# Patient Record
Sex: FEMALE | Race: WHITE | NOT HISPANIC OR LATINO | URBAN - METROPOLITAN AREA
[De-identification: names, ages, dates, MRNs, and addresses within clinical notes are randomized per-mention and may not be internally consistent; named-entity substitution may affect disease eponyms.]

---

## 2020-03-29 VITALS
SYSTOLIC BLOOD PRESSURE: 135 MMHG | TEMPERATURE: 100 F | DIASTOLIC BLOOD PRESSURE: 80 MMHG | RESPIRATION RATE: 20 BRPM | OXYGEN SATURATION: 95 % | HEART RATE: 71 BPM

## 2020-03-29 LAB
ALBUMIN SERPL ELPH-MCNC: 3.9 G/DL — SIGNIFICANT CHANGE UP (ref 3.3–5)
ALP SERPL-CCNC: 73 U/L — SIGNIFICANT CHANGE UP (ref 40–120)
ALT FLD-CCNC: 55 U/L — HIGH (ref 10–45)
ANION GAP SERPL CALC-SCNC: 12 MMOL/L — SIGNIFICANT CHANGE UP (ref 5–17)
AST SERPL-CCNC: 59 U/L — HIGH (ref 10–40)
BASOPHILS # BLD AUTO: 0.01 K/UL — SIGNIFICANT CHANGE UP (ref 0–0.2)
BASOPHILS NFR BLD AUTO: 0.2 % — SIGNIFICANT CHANGE UP (ref 0–2)
BILIRUB SERPL-MCNC: 0.3 MG/DL — SIGNIFICANT CHANGE UP (ref 0.2–1.2)
BUN SERPL-MCNC: 10 MG/DL — SIGNIFICANT CHANGE UP (ref 7–23)
CALCIUM SERPL-MCNC: 8.6 MG/DL — SIGNIFICANT CHANGE UP (ref 8.4–10.5)
CHLORIDE SERPL-SCNC: 100 MMOL/L — SIGNIFICANT CHANGE UP (ref 96–108)
CO2 SERPL-SCNC: 22 MMOL/L — SIGNIFICANT CHANGE UP (ref 22–31)
CREAT SERPL-MCNC: 0.72 MG/DL — SIGNIFICANT CHANGE UP (ref 0.5–1.3)
EOSINOPHIL # BLD AUTO: 0 K/UL — SIGNIFICANT CHANGE UP (ref 0–0.5)
EOSINOPHIL NFR BLD AUTO: 0 % — SIGNIFICANT CHANGE UP (ref 0–6)
GLUCOSE SERPL-MCNC: 104 MG/DL — HIGH (ref 70–99)
HCT VFR BLD CALC: 38.9 % — SIGNIFICANT CHANGE UP (ref 34.5–45)
HGB BLD-MCNC: 13.2 G/DL — SIGNIFICANT CHANGE UP (ref 11.5–15.5)
IMM GRANULOCYTES NFR BLD AUTO: 1.5 % — SIGNIFICANT CHANGE UP (ref 0–1.5)
LYMPHOCYTES # BLD AUTO: 0.69 K/UL — LOW (ref 1–3.3)
LYMPHOCYTES # BLD AUTO: 17.1 % — SIGNIFICANT CHANGE UP (ref 13–44)
MCHC RBC-ENTMCNC: 31.5 PG — SIGNIFICANT CHANGE UP (ref 27–34)
MCHC RBC-ENTMCNC: 33.9 GM/DL — SIGNIFICANT CHANGE UP (ref 32–36)
MCV RBC AUTO: 92.8 FL — SIGNIFICANT CHANGE UP (ref 80–100)
MONOCYTES # BLD AUTO: 0.19 K/UL — SIGNIFICANT CHANGE UP (ref 0–0.9)
MONOCYTES NFR BLD AUTO: 4.7 % — SIGNIFICANT CHANGE UP (ref 2–14)
NEUTROPHILS # BLD AUTO: 3.08 K/UL — SIGNIFICANT CHANGE UP (ref 1.8–7.4)
NEUTROPHILS NFR BLD AUTO: 76.5 % — SIGNIFICANT CHANGE UP (ref 43–77)
NRBC # BLD: 0 /100 WBCS — SIGNIFICANT CHANGE UP (ref 0–0)
PLATELET # BLD AUTO: 47 K/UL — LOW (ref 150–400)
POTASSIUM SERPL-MCNC: 4.4 MMOL/L — SIGNIFICANT CHANGE UP (ref 3.5–5.3)
POTASSIUM SERPL-SCNC: 4.4 MMOL/L — SIGNIFICANT CHANGE UP (ref 3.5–5.3)
PROT SERPL-MCNC: 6.8 G/DL — SIGNIFICANT CHANGE UP (ref 6–8.3)
RBC # BLD: 4.19 M/UL — SIGNIFICANT CHANGE UP (ref 3.8–5.2)
RBC # FLD: 13.7 % — SIGNIFICANT CHANGE UP (ref 10.3–14.5)
SODIUM SERPL-SCNC: 134 MMOL/L — LOW (ref 135–145)
WBC # BLD: 4.03 K/UL — SIGNIFICANT CHANGE UP (ref 3.8–10.5)
WBC # FLD AUTO: 4.03 K/UL — SIGNIFICANT CHANGE UP (ref 3.8–10.5)

## 2020-03-29 NOTE — ED ADULT TRIAGE NOTE - CHIEF COMPLAINT QUOTE
presents to ED by EMS for SOB.  patient has had fevers x 1 week with diarrhea.  Was initiated on azithromycin 3 days ago.   admitted to hospital for COVID-19.

## 2020-03-29 NOTE — ED ADULT NURSE REASSESSMENT NOTE - NS ED NURSE REASSESS COMMENT FT1
Pt arrives via EMS c/o fever, cough and weakness. Assessment as noted, IV access established, labs drawn and nasopharyngeal swab obtained, sent to lab. waiting results

## 2020-03-30 ENCOUNTER — INPATIENT (INPATIENT)
Facility: HOSPITAL | Age: 70
LOS: 7 days | Discharge: HOME | DRG: 177 | End: 2020-04-07
Attending: INTERNAL MEDICINE | Admitting: INTERNAL MEDICINE
Payer: MEDICARE

## 2020-03-30 DIAGNOSIS — B97.21 SARS-ASSOCIATED CORONAVIRUS AS THE CAUSE OF DISEASES CLASSIFIED ELSEWHERE: ICD-10-CM

## 2020-03-30 DIAGNOSIS — Z29.9 ENCOUNTER FOR PROPHYLACTIC MEASURES, UNSPECIFIED: ICD-10-CM

## 2020-03-30 DIAGNOSIS — R63.8 OTHER SYMPTOMS AND SIGNS CONCERNING FOOD AND FLUID INTAKE: ICD-10-CM

## 2020-03-30 DIAGNOSIS — E03.9 HYPOTHYROIDISM, UNSPECIFIED: ICD-10-CM

## 2020-03-30 DIAGNOSIS — Z90.49 ACQUIRED ABSENCE OF OTHER SPECIFIED PARTS OF DIGESTIVE TRACT: Chronic | ICD-10-CM

## 2020-03-30 LAB
ALBUMIN SERPL ELPH-MCNC: 3.9 G/DL — SIGNIFICANT CHANGE UP (ref 3.3–5)
ALP SERPL-CCNC: 77 U/L — SIGNIFICANT CHANGE UP (ref 40–120)
ALT FLD-CCNC: 56 U/L — HIGH (ref 10–45)
ANION GAP SERPL CALC-SCNC: 12 MMOL/L — SIGNIFICANT CHANGE UP (ref 5–17)
APTT BLD: 32.1 SEC — SIGNIFICANT CHANGE UP (ref 27.5–36.3)
AST SERPL-CCNC: 63 U/L — HIGH (ref 10–40)
BASOPHILS # BLD AUTO: 0.01 K/UL — SIGNIFICANT CHANGE UP (ref 0–0.2)
BASOPHILS NFR BLD AUTO: 0.3 % — SIGNIFICANT CHANGE UP (ref 0–2)
BILIRUB SERPL-MCNC: 0.3 MG/DL — SIGNIFICANT CHANGE UP (ref 0.2–1.2)
BUN SERPL-MCNC: 9 MG/DL — SIGNIFICANT CHANGE UP (ref 7–23)
CALCIUM SERPL-MCNC: 8.6 MG/DL — SIGNIFICANT CHANGE UP (ref 8.4–10.5)
CHLORIDE SERPL-SCNC: 100 MMOL/L — SIGNIFICANT CHANGE UP (ref 96–108)
CK SERPL-CCNC: 116 U/L — SIGNIFICANT CHANGE UP (ref 25–170)
CO2 SERPL-SCNC: 25 MMOL/L — SIGNIFICANT CHANGE UP (ref 22–31)
CREAT SERPL-MCNC: 0.8 MG/DL — SIGNIFICANT CHANGE UP (ref 0.5–1.3)
CRP SERPL-MCNC: 8.97 MG/DL — HIGH (ref 0–0.4)
D DIMER BLD IA.RAPID-MCNC: 416 NG/ML DDU — HIGH
EOSINOPHIL # BLD AUTO: 0 K/UL — SIGNIFICANT CHANGE UP (ref 0–0.5)
EOSINOPHIL NFR BLD AUTO: 0 % — SIGNIFICANT CHANGE UP (ref 0–6)
ERYTHROCYTE [SEDIMENTATION RATE] IN BLOOD: 45 MM/HR — HIGH
FERRITIN SERPL-MCNC: 1423 NG/ML — HIGH (ref 15–150)
GLUCOSE SERPL-MCNC: 99 MG/DL — SIGNIFICANT CHANGE UP (ref 70–99)
HCT VFR BLD CALC: 40.5 % — SIGNIFICANT CHANGE UP (ref 34.5–45)
HGB BLD-MCNC: 13.5 G/DL — SIGNIFICANT CHANGE UP (ref 11.5–15.5)
IMM GRANULOCYTES NFR BLD AUTO: 1.8 % — HIGH (ref 0–1.5)
INR BLD: 1 — SIGNIFICANT CHANGE UP (ref 0.88–1.16)
LACTATE SERPL-SCNC: 0.8 MMOL/L — SIGNIFICANT CHANGE UP (ref 0.5–2)
LYMPHOCYTES # BLD AUTO: 0.74 K/UL — LOW (ref 1–3.3)
LYMPHOCYTES # BLD AUTO: 18.6 % — SIGNIFICANT CHANGE UP (ref 13–44)
MAGNESIUM SERPL-MCNC: 1.8 MG/DL — SIGNIFICANT CHANGE UP (ref 1.6–2.6)
MCHC RBC-ENTMCNC: 31.5 PG — SIGNIFICANT CHANGE UP (ref 27–34)
MCHC RBC-ENTMCNC: 33.3 GM/DL — SIGNIFICANT CHANGE UP (ref 32–36)
MCV RBC AUTO: 94.6 FL — SIGNIFICANT CHANGE UP (ref 80–100)
MONOCYTES # BLD AUTO: 0.22 K/UL — SIGNIFICANT CHANGE UP (ref 0–0.9)
MONOCYTES NFR BLD AUTO: 5.5 % — SIGNIFICANT CHANGE UP (ref 2–14)
NEUTROPHILS # BLD AUTO: 2.93 K/UL — SIGNIFICANT CHANGE UP (ref 1.8–7.4)
NEUTROPHILS NFR BLD AUTO: 73.8 % — SIGNIFICANT CHANGE UP (ref 43–77)
NRBC # BLD: 0 /100 WBCS — SIGNIFICANT CHANGE UP (ref 0–0)
NT-PROBNP SERPL-SCNC: 68 PG/ML — SIGNIFICANT CHANGE UP (ref 0–300)
PHOSPHATE SERPL-MCNC: 2.6 MG/DL — SIGNIFICANT CHANGE UP (ref 2.5–4.5)
PLATELET # BLD AUTO: 45 K/UL — LOW (ref 150–400)
POTASSIUM SERPL-MCNC: 4.7 MMOL/L — SIGNIFICANT CHANGE UP (ref 3.5–5.3)
POTASSIUM SERPL-SCNC: 4.7 MMOL/L — SIGNIFICANT CHANGE UP (ref 3.5–5.3)
PROCALCITONIN SERPL-MCNC: 0.1 NG/ML — SIGNIFICANT CHANGE UP (ref 0.02–0.1)
PROT SERPL-MCNC: 7 G/DL — SIGNIFICANT CHANGE UP (ref 6–8.3)
PROTHROM AB SERPL-ACNC: 11.4 SEC — SIGNIFICANT CHANGE UP (ref 10–12.9)
RBC # BLD: 4.28 M/UL — SIGNIFICANT CHANGE UP (ref 3.8–5.2)
RBC # FLD: 13.7 % — SIGNIFICANT CHANGE UP (ref 10.3–14.5)
SARS-COV-2 RNA SPEC QL NAA+PROBE: DETECTED
SODIUM SERPL-SCNC: 137 MMOL/L — SIGNIFICANT CHANGE UP (ref 135–145)
TROPONIN T SERPL-MCNC: <0.01 NG/ML — SIGNIFICANT CHANGE UP (ref 0–0.01)
WBC # BLD: 3.97 K/UL — SIGNIFICANT CHANGE UP (ref 3.8–10.5)
WBC # FLD AUTO: 3.97 K/UL — SIGNIFICANT CHANGE UP (ref 3.8–10.5)

## 2020-03-30 PROCEDURE — 71045 X-RAY EXAM CHEST 1 VIEW: CPT | Mod: 26

## 2020-03-30 PROCEDURE — 93010 ELECTROCARDIOGRAM REPORT: CPT

## 2020-03-30 PROCEDURE — 99285 EMERGENCY DEPT VISIT HI MDM: CPT

## 2020-03-30 PROCEDURE — 99222 1ST HOSP IP/OBS MODERATE 55: CPT | Mod: GC

## 2020-03-30 RX ORDER — FAMOTIDINE 10 MG/ML
20 INJECTION INTRAVENOUS DAILY
Refills: 0 | Status: DISCONTINUED | OUTPATIENT
Start: 2020-03-30 | End: 2020-04-07

## 2020-03-30 RX ORDER — HYDROXYCHLOROQUINE SULFATE 200 MG
400 TABLET ORAL EVERY 12 HOURS
Refills: 0 | Status: COMPLETED | OUTPATIENT
Start: 2020-03-30 | End: 2020-03-30

## 2020-03-30 RX ORDER — THIAMINE MONONITRATE (VIT B1) 100 MG
200 TABLET ORAL DAILY
Refills: 0 | Status: DISCONTINUED | OUTPATIENT
Start: 2020-03-30 | End: 2020-03-30

## 2020-03-30 RX ORDER — LEVOTHYROXINE SODIUM 125 MCG
25 TABLET ORAL DAILY
Refills: 0 | Status: DISCONTINUED | OUTPATIENT
Start: 2020-03-30 | End: 2020-04-07

## 2020-03-30 RX ORDER — LEVOTHYROXINE SODIUM 125 MCG
1 TABLET ORAL
Qty: 0 | Refills: 0 | DISCHARGE

## 2020-03-30 RX ORDER — THIAMINE MONONITRATE (VIT B1) 100 MG
200 TABLET ORAL
Refills: 0 | Status: COMPLETED | OUTPATIENT
Start: 2020-03-30 | End: 2020-04-03

## 2020-03-30 RX ORDER — HYDROXYCHLOROQUINE SULFATE 200 MG
TABLET ORAL
Refills: 0 | Status: DISCONTINUED | OUTPATIENT
Start: 2020-03-30 | End: 2020-03-30

## 2020-03-30 RX ORDER — THIAMINE MONONITRATE (VIT B1) 100 MG
200 TABLET ORAL
Refills: 0 | Status: DISCONTINUED | OUTPATIENT
Start: 2020-03-30 | End: 2020-03-30

## 2020-03-30 RX ORDER — AZITHROMYCIN 500 MG/1
250 TABLET, FILM COATED ORAL DAILY
Refills: 0 | Status: COMPLETED | OUTPATIENT
Start: 2020-03-30 | End: 2020-04-03

## 2020-03-30 RX ORDER — HYDROXYCHLOROQUINE SULFATE 200 MG
200 TABLET ORAL EVERY 12 HOURS
Refills: 0 | Status: COMPLETED | OUTPATIENT
Start: 2020-03-31 | End: 2020-04-03

## 2020-03-30 RX ORDER — ONDANSETRON 8 MG/1
4 TABLET, FILM COATED ORAL ONCE
Refills: 0 | Status: COMPLETED | OUTPATIENT
Start: 2020-03-30 | End: 2020-03-30

## 2020-03-30 RX ORDER — ASCORBIC ACID 60 MG
1500 TABLET,CHEWABLE ORAL DAILY
Refills: 0 | Status: DISCONTINUED | OUTPATIENT
Start: 2020-03-30 | End: 2020-03-30

## 2020-03-30 RX ORDER — ACETAMINOPHEN 500 MG
0 TABLET ORAL
Qty: 0 | Refills: 0 | DISCHARGE

## 2020-03-30 RX ORDER — ACETAMINOPHEN 500 MG
650 TABLET ORAL EVERY 6 HOURS
Refills: 0 | Status: DISCONTINUED | OUTPATIENT
Start: 2020-03-30 | End: 2020-04-01

## 2020-03-30 RX ORDER — ASCORBIC ACID 60 MG
1500 TABLET,CHEWABLE ORAL
Refills: 0 | Status: DISCONTINUED | OUTPATIENT
Start: 2020-03-30 | End: 2020-04-03

## 2020-03-30 RX ORDER — HYDROXYCHLOROQUINE SULFATE 200 MG
200 TABLET ORAL ONCE
Refills: 0 | Status: COMPLETED | OUTPATIENT
Start: 2020-03-31 | End: 2020-03-31

## 2020-03-30 RX ORDER — ENOXAPARIN SODIUM 100 MG/ML
40 INJECTION SUBCUTANEOUS EVERY 24 HOURS
Refills: 0 | Status: DISCONTINUED | OUTPATIENT
Start: 2020-03-30 | End: 2020-03-30

## 2020-03-30 RX ADMIN — Medication 650 MILLIGRAM(S): at 03:51

## 2020-03-30 RX ADMIN — Medication 650 MILLIGRAM(S): at 18:02

## 2020-03-30 RX ADMIN — Medication 400 MILLIGRAM(S): at 18:02

## 2020-03-30 RX ADMIN — Medication 1500 MILLIGRAM(S): at 18:02

## 2020-03-30 RX ADMIN — Medication 1500 MILLIGRAM(S): at 13:05

## 2020-03-30 RX ADMIN — ENOXAPARIN SODIUM 40 MILLIGRAM(S): 100 INJECTION SUBCUTANEOUS at 13:02

## 2020-03-30 RX ADMIN — Medication 400 MILLIGRAM(S): at 09:27

## 2020-03-30 RX ADMIN — Medication 650 MILLIGRAM(S): at 02:12

## 2020-03-30 RX ADMIN — Medication 200 MILLIGRAM(S): at 06:58

## 2020-03-30 RX ADMIN — Medication 200 MILLIGRAM(S): at 18:01

## 2020-03-30 RX ADMIN — Medication 25 MICROGRAM(S): at 06:58

## 2020-03-30 RX ADMIN — AZITHROMYCIN 250 MILLIGRAM(S): 500 TABLET, FILM COATED ORAL at 13:04

## 2020-03-30 RX ADMIN — ONDANSETRON 4 MILLIGRAM(S): 8 TABLET, FILM COATED ORAL at 13:03

## 2020-03-30 RX ADMIN — FAMOTIDINE 20 MILLIGRAM(S): 10 INJECTION INTRAVENOUS at 13:03

## 2020-03-30 RX ADMIN — Medication 1500 MILLIGRAM(S): at 09:27

## 2020-03-30 RX ADMIN — Medication 650 MILLIGRAM(S): at 13:02

## 2020-03-30 RX ADMIN — ONDANSETRON 4 MILLIGRAM(S): 8 TABLET, FILM COATED ORAL at 00:54

## 2020-03-30 NOTE — H&P ADULT - NSHPPHYSICALEXAM_GEN_ALL_CORE
.  VITAL SIGNS:  T(C): 37.7 (03-29-20 @ 22:13), Max: 37.7 (03-29-20 @ 22:13)  T(F): 99.9 (03-29-20 @ 22:13), Max: 99.9 (03-29-20 @ 22:13)  HR: 71 (03-29-20 @ 22:13) (71 - 71)  BP: 135/80 (03-29-20 @ 22:13) (135/80 - 135/80)  BP(mean): --  RR: 20 (03-29-20 @ 22:13) (20 - 20)  SpO2: 91% (03-30-20 @ 00:12) (91% - 95%)  Wt(kg): --    PHYSICAL EXAM:    Constitutional: WDWN resting comfortably in bed on 2L NC; NAD  Head: NC/AT  Eyes: PERRL, EOMI, anicteric sclera  ENT: no nasal discharge; uvula midline, no oropharyngeal erythema or exudates; MMM  Neck: supple; no JVD or thyromegaly  Respiratory: CTA B/L; no W/R/R, no retractions  Cardiac: +S1/S2; RRR; no M/R/G; PMI non-displaced  Gastrointestinal: abdomen soft, NT/ND; no rebound or guarding; +BSx4  Back: spine midline, no bony tenderness or step-offs; no CVAT B/L  Extremities: WWP, no clubbing or cyanosis; no peripheral edema  Musculoskeletal: NROM x4; no joint swelling, tenderness or erythema  Vascular: 2+ radial, femoral, DP/PT pulses B/L  Dermatologic: skin warm, dry and intact; no rashes, wounds, or scars  Lymphatic: no submandibular or cervical LAD  Neurologic: AAOx3; CNII-XII grossly intact; no focal deficits  Psychiatric: affect and characteristics of appearance, verbalizations, behaviors are appropriate

## 2020-03-30 NOTE — H&P ADULT - NSHPREVIEWOFSYSTEMS_GEN_ALL_CORE
Fevers: Y  Malaise: Y  Myalgias: Y  SOB: Y          At rest: Y         With exertion: Y/N         At baseline: Y/ N  Cough: Y        Productive: N  Nausea: Y  Vomiting: Y  Diarrhea: Y

## 2020-03-30 NOTE — H&P ADULT - NSHPLABSRESULTS_GEN_ALL_CORE
13.2   4.03  )-----------( 47       ( 29 Mar 2020 22:57 )             38.9     03-29    134<L>  |  100  |  10  ----------------------------<  104<H>  4.4   |  22  |  0.72    Ca    8.6      29 Mar 2020 22:56    TPro  6.8  /  Alb  3.9  /  TBili  0.3  /  DBili  x   /  AST  59<H>  /  ALT  55<H>  /  AlkPhos  73  03-29

## 2020-03-30 NOTE — ED PROVIDER NOTE - OBJECTIVE STATEMENT
69F nonsmoker, no medical problems/no Rx, c/o 5d daily fever (tmax 102)/cough/malaise/myalagias/decreased appetite/nausea and now 2-3d progressively worsening sob. no ha/dizziness, no vision changes, no neck pain/stiffness, no abd pain/vomiting, no diarrhea, no dysuria. 69F nonsmoker, no medical problems/no Rx, c/o 5d daily fever (tmax 102)/cough/malaise/myalgias/decreased appetite/nausea and now 2-3d progressively worsening sob. no ha/dizziness, no vision changes, no neck pain/stiffness, no abd pain/vomiting, no diarrhea, no dysuria.

## 2020-03-30 NOTE — ED PROVIDER NOTE - CLINICAL SUMMARY MEDICAL DECISION MAKING FREE TEXT BOX
afebrile. hds. hypoxia 91% on RA improved w/ 2L nc. no active cp. no acute resp distress. found to have lymphocytopenia and cxr c/f pna/covid. covid pending. bcx pending. no sig anemia. no electrolyte abnl. trop neg. ekg w/o acute abnl. will admit.

## 2020-03-30 NOTE — ED PROVIDER NOTE - PHYSICAL EXAMINATION
CONST: tired appearing NAD speaking in full sentences on NC  HEAD: atraumatic  EYES: conjunctivae clear  ENT: mmm  NECK: supple/FROM, nttp, no jvd  CARD: rrr no murmurs  CHEST: bl rales  ABD: soft, nd, nttp, no rebound/guarding  EXT: FROM, symmetric distal pulses intact  SKIN: warm, dry, no rash, no pedal edema/pain/rash, cap refill <2sec  NEURO: a+ox3, 5/5 strength x4, gross sensation intact x4

## 2020-03-30 NOTE — ED ADULT NURSE REASSESSMENT NOTE - NS ED NURSE REASSESS COMMENT FT1
Orders received, pt medicated per MAR, tolerated well. Pt waiting admission bed assignment. NAD noted

## 2020-03-30 NOTE — H&P ADULT - HISTORY OF PRESENT ILLNESS
Pt is a 68 y/o F with PMHx hypothyroidism, splenic artery aneurysm (s/p surgery), appendectomy who presented to ED with fever and worsening SOB x5days. Pt states her symptoms started with myalgia and weakness 5-6 days ago. About a day later she started to have fevers and SOB. Her highest fever was 102. She has been taking tylenol for the fevers. Her SOB was at rest and worse when speaking. Pt was admits to having non productive cough with deep breaths, nausea, vomiting (+1), diarrhea.     ED Course:   Vitals: Temp: 99, /80, HR 71, O2 95% on 2L NC Pt is a 70 y/o F with PMHx hypothyroidism, splenic artery aneurysm (s/p surgery), appendectomy who presented to ED with fever and worsening SOB x5days. Pt states her symptoms started with myalgia and weakness 5-6 days ago. About a day later she started to have fevers and SOB. Her highest fever was 102. She has been taking tylenol for the fevers. Her SOB is at rest and worse when speaking. Pt was admits to having non productive cough with deep breaths, nausea, vomiting (+1), diarrhea.  Pt was started by her azithromycin 3 days ago by her PCP.     Of note, Pt's  is currently admitted and [facility] with Corona virus    ED Course:   Vitals: Temp: 99, /80, HR 71, O2 95% on 2L NC  Significant labs: thrombocytopenia- 47, lymphopenia 0.69, hyponatremia 134, AST/ALT 59/55  COVID- Pending  CXR: B/l infiltrate; official read pending Pt is a 68 y/o F with PMHx hypothyroidism, splenic artery aneurysm (s/p surgery), appendectomy who presented to ED with fever and worsening SOB x5days. Pt states her symptoms started with myalgia and weakness 5-6 days ago. About a day later she started to have fevers and SOB. Her highest fever was 102. She has been taking tylenol for the fevers. Her SOB is at rest and worse when speaking. Pt admits to having non productive cough with deep breaths, nausea, vomiting (+1), diarrhea.  Pt was started on azithromycin 3 days ago by her PCP.     Of note, Pt's  is currently admitted and [facility] with Corona virus    ED Course:   Vitals: Temp: 99, /80, HR 71, O2 95% on 2L NC  Significant labs: thrombocytopenia- 47, lymphopenia 0.69, hyponatremia 134, AST/ALT 59/55  COVID- Pending  CXR: B/l infiltrate; official read pending Pt is a 70 y/o F with PMHx hypothyroidism, splenic artery aneurysm (s/p surgery), appendectomy who presented to ED with fever and worsening SOB x5days. Pt states her symptoms started with myalgia and weakness 5-6 days ago. About a day later she started to have fevers and SOB. Her highest fever was 102. She has been taking tylenol for the fevers. Her SOB is at rest and worse when speaking. Pt admits to having non productive cough with deep breaths, nausea, vomiting (+1), diarrhea.  Pt was started on azithromycin 3 days ago by her PCP.     Of note, Pt's  is currently admitted at [facility] with Corona virus    ED Course:   Vitals: Temp: 99, /80, HR 71, O2 95% on 2L NC  Significant labs: thrombocytopenia- 47, lymphopenia 0.69, hyponatremia 134, AST/ALT 59/55  COVID- Pending  CXR: B/l infiltrate; official read pending

## 2020-03-30 NOTE — ED PROVIDER NOTE - CARE PLAN
Principal Discharge DX:	Hypoxia  Secondary Diagnosis:	Shortness of breath  Secondary Diagnosis:	Fever

## 2020-03-30 NOTE — H&P ADULT - PROBLEM SELECTOR PLAN 1
Pt with symptoms suggestive of COVID, b/l lobe infiltrates, thrombocytopenia- 47, lymphopenia 0.69, hyponatremia 134, AST/ALT 59/55    - Isolation precautions; contact and isolation  - F/u covid-19 PCR  - Monitor O2 saturation, monitor for respiratory distress  - Nasal cannula as needed, avoid HFNC/BiPAP  - Tylenol 650mg for fever   - Started ascorbic acid 1500mg IV q6h  - Started thiamine 200mg IV BID  - Started hydroxyxhloroquine 400mg IV BID x2 doses, then 200mg BID for 4 days  - Started azithromycin 250mg QD  - F/u D-Dimer, Fibrinogen, Haptoglobin, Procalcitonin, Ferritin, ESR/CRP, LDH, CK  - F/u G6PD

## 2020-03-30 NOTE — H&P ADULT - ATTENDING COMMENTS
69F w hypothyroidism, splenic artery aneurysm p/w fever and dyspnea x5 days, started on azithro by PCP 3d prior, recent diarrhea, CXR w R>L opacity, found to be hypoxemic on RA now on 2L NC c/f viral pna 2/2 COVID19, started on azirthromycin/plaquenil, VitC, Thiamine bundle    Pt resting in bed in ED - on 3L. Endorses fatigue and poor appetite. States dyspnea feels about the same as before she came in. Denies any chest pain, abd pain, dysuria. Does endorse nausea and chills.     Exam: female in NAD, RRR, nml effort, b/l inspiratory rales at bases, soft abd, NABS, non-tender, alert, moving all ext  Labs: eSR 45, A.74, D-dimer 416, lactate 0.8, ferritin 1423, CRP 8.9, procalc <0.01, BNP 58    #Acute hypoxemic respiratory failure - likely 2/2 viral pna. no edema or BNP to suggest HF or volume overload. PE less likely. Less likely bacterial pna w negative procalcitonin and lack of consolidation  #Viral pneumonia - b/l   #Suspected COVID19 - testing pending. Zofran for nausea. c/w treatment bundle (indicated in setting of hypoxemia and elevated ferritin, d-dimer)  #Hypothyroidism - synthroid 25    Plan  f/u COVID PCR  AM labs w inflammatory markers, AM EKG (QTc 403 3/30)  Encourage PO, fluid intake  Titrate O2 >93%  PPx w SQL  Dispo pending improvement in O2 requirement  Anticipated dc date 48h 69F w hypothyroidism, splenic artery aneurysm p/w fever and dyspnea x5 days, started on azithro by PCP 3d prior, recent diarrhea, CXR w R>L opacity, found to be hypoxemic on RA now on 2L NC c/f viral pna 2/2 COVID19, started on azirthromycin/plaquenil, VitC, Thiamine bundle    Pt resting in bed in ED - on 3L. Endorses fatigue and poor appetite. States dyspnea feels about the same as before she came in. Denies any chest pain, abd pain, dysuria. Does endorse nausea and chills.     Exam: female in NAD, RRR, nml effort, b/l inspiratory rales at bases, soft abd, NABS, non-tender, alert, moving all ext  Labs: eSR 45, A.74, D-dimer 416, lactate 0.8, ferritin 1423, CRP 8.9, procalc <0.01, BNP 58    #Acute hypoxemic respiratory failure - likely 2/2 viral pna. no edema or BNP to suggest HF or volume overload. PE less likely. Less likely bacterial pna w negative procalcitonin and lack of consolidation  #Viral pneumonia - b/l   #Suspected COVID19 - testing pending. Zofran for nausea. c/w treatment bundle (indicated in setting of hypoxemia and elevated ferritin, d-dimer)  #Hypothyroidism - synthroid 25  #Thrombocytopenia - 45 from 47 on admission. Does not have any bleeding sxs at this time. Would hold pharmacologic PPx    Plan  f/u COVID PCR  AM labs w inflammatory markers, AM EKG (QTc 403 3/30)  Encourage PO, fluid intake  Titrate O2 >93%  Holding pharmacologic ppx (SQL) due to thrombocytopenia  SCDs, OOB as tolerated  Dispo pending improvement in O2 requirement  Anticipated dc date 48h

## 2020-03-30 NOTE — ED ADULT NURSE REASSESSMENT NOTE - NS ED NURSE REASSESS COMMENT FT1
pt noted to have fever of 101F on 7 am VS, pt stating she received PO tylenol at that time, VS recheck, temp now at 99.5F, all other VS stable, pt in no distress, two sets of cultures sent, medicine team paged, awaiting callback

## 2020-03-30 NOTE — H&P ADULT - ASSESSMENT
Pt is a 68 y/o F with PMHx hypothyroidism, splenic artery aneurysm (s/p surgery), appendectomy, now admitted to r/o Corona virus

## 2020-03-31 DIAGNOSIS — J96.91 RESPIRATORY FAILURE, UNSPECIFIED WITH HYPOXIA: ICD-10-CM

## 2020-03-31 DIAGNOSIS — D69.6 THROMBOCYTOPENIA, UNSPECIFIED: ICD-10-CM

## 2020-03-31 LAB
ALBUMIN SERPL ELPH-MCNC: 3.5 G/DL — SIGNIFICANT CHANGE UP (ref 3.3–5)
ALP SERPL-CCNC: 77 U/L — SIGNIFICANT CHANGE UP (ref 40–120)
ALT FLD-CCNC: 66 U/L — HIGH (ref 10–45)
ANION GAP SERPL CALC-SCNC: 12 MMOL/L — SIGNIFICANT CHANGE UP (ref 5–17)
AST SERPL-CCNC: 80 U/L — HIGH (ref 10–40)
BASOPHILS # BLD AUTO: 0 K/UL — SIGNIFICANT CHANGE UP (ref 0–0.2)
BASOPHILS NFR BLD AUTO: 0 % — SIGNIFICANT CHANGE UP (ref 0–2)
BILIRUB SERPL-MCNC: 0.3 MG/DL — SIGNIFICANT CHANGE UP (ref 0.2–1.2)
BUN SERPL-MCNC: 10 MG/DL — SIGNIFICANT CHANGE UP (ref 7–23)
CALCIUM SERPL-MCNC: 8.6 MG/DL — SIGNIFICANT CHANGE UP (ref 8.4–10.5)
CHLORIDE SERPL-SCNC: 97 MMOL/L — SIGNIFICANT CHANGE UP (ref 96–108)
CLOSURE TME COLL+EPINEP BLD: 45 K/UL — LOW (ref 150–400)
CLOSURE TME COLL+EPINEP BLD: 62 K/UL — LOW (ref 150–400)
CO2 SERPL-SCNC: 24 MMOL/L — SIGNIFICANT CHANGE UP (ref 22–31)
CREAT SERPL-MCNC: 0.85 MG/DL — SIGNIFICANT CHANGE UP (ref 0.5–1.3)
CRP SERPL-MCNC: 9.52 MG/DL — HIGH (ref 0–0.4)
CULTURE RESULTS: SIGNIFICANT CHANGE UP
D DIMER BLD IA.RAPID-MCNC: 308 NG/ML DDU — HIGH
EOSINOPHIL # BLD AUTO: 0 K/UL — SIGNIFICANT CHANGE UP (ref 0–0.5)
EOSINOPHIL NFR BLD AUTO: 0 % — SIGNIFICANT CHANGE UP (ref 0–6)
FERRITIN SERPL-MCNC: 1918 NG/ML — HIGH (ref 15–150)
GLUCOSE SERPL-MCNC: 96 MG/DL — SIGNIFICANT CHANGE UP (ref 70–99)
HCT VFR BLD CALC: 37.7 % — SIGNIFICANT CHANGE UP (ref 34.5–45)
HCV AB S/CO SERPL IA: 0.08 S/CO — SIGNIFICANT CHANGE UP
HCV AB SERPL-IMP: SIGNIFICANT CHANGE UP
HGB BLD-MCNC: 12.4 G/DL — SIGNIFICANT CHANGE UP (ref 11.5–15.5)
LDH SERPL L TO P-CCNC: 322 U/L — HIGH (ref 50–242)
LYMPHOCYTES # BLD AUTO: 0.41 K/UL — LOW (ref 1–3.3)
LYMPHOCYTES # BLD AUTO: 11.4 % — LOW (ref 13–44)
MAGNESIUM SERPL-MCNC: 2 MG/DL — SIGNIFICANT CHANGE UP (ref 1.6–2.6)
MCHC RBC-ENTMCNC: 31.2 PG — SIGNIFICANT CHANGE UP (ref 27–34)
MCHC RBC-ENTMCNC: 32.9 GM/DL — SIGNIFICANT CHANGE UP (ref 32–36)
MCV RBC AUTO: 95 FL — SIGNIFICANT CHANGE UP (ref 80–100)
MONOCYTES # BLD AUTO: 0.29 K/UL — SIGNIFICANT CHANGE UP (ref 0–0.9)
MONOCYTES NFR BLD AUTO: 7.9 % — SIGNIFICANT CHANGE UP (ref 2–14)
NEUTROPHILS # BLD AUTO: 2.73 K/UL — SIGNIFICANT CHANGE UP (ref 1.8–7.4)
NEUTROPHILS NFR BLD AUTO: 70.2 % — SIGNIFICANT CHANGE UP (ref 43–77)
PHOSPHATE SERPL-MCNC: 2.5 MG/DL — SIGNIFICANT CHANGE UP (ref 2.5–4.5)
PLATELET # BLD AUTO: 56 K/UL — LOW (ref 150–400)
POTASSIUM SERPL-MCNC: 4.2 MMOL/L — SIGNIFICANT CHANGE UP (ref 3.5–5.3)
POTASSIUM SERPL-SCNC: 4.2 MMOL/L — SIGNIFICANT CHANGE UP (ref 3.5–5.3)
PROT SERPL-MCNC: 6.7 G/DL — SIGNIFICANT CHANGE UP (ref 6–8.3)
RBC # BLD: 3.97 M/UL — SIGNIFICANT CHANGE UP (ref 3.8–5.2)
RBC # FLD: 13.7 % — SIGNIFICANT CHANGE UP (ref 10.3–14.5)
SARS-COV-2 RNA SPEC QL NAA+PROBE: (no result)
SODIUM SERPL-SCNC: 133 MMOL/L — LOW (ref 135–145)
SPECIMEN SOURCE: SIGNIFICANT CHANGE UP
WBC # BLD: 3.62 K/UL — LOW (ref 3.8–10.5)
WBC # FLD AUTO: 3.62 K/UL — LOW (ref 3.8–10.5)

## 2020-03-31 PROCEDURE — 99233 SBSQ HOSP IP/OBS HIGH 50: CPT | Mod: GC

## 2020-03-31 RX ORDER — SODIUM CHLORIDE 9 MG/ML
500 INJECTION INTRAMUSCULAR; INTRAVENOUS; SUBCUTANEOUS ONCE
Refills: 0 | Status: COMPLETED | OUTPATIENT
Start: 2020-03-31 | End: 2020-03-31

## 2020-03-31 RX ORDER — ONDANSETRON 8 MG/1
4 TABLET, FILM COATED ORAL ONCE
Refills: 0 | Status: COMPLETED | OUTPATIENT
Start: 2020-03-31 | End: 2020-03-31

## 2020-03-31 RX ORDER — BENZOCAINE AND MENTHOL 5; 1 G/100ML; G/100ML
1 LIQUID ORAL
Refills: 0 | Status: DISCONTINUED | OUTPATIENT
Start: 2020-03-31 | End: 2020-04-07

## 2020-03-31 RX ADMIN — Medication 200 MILLIGRAM(S): at 17:27

## 2020-03-31 RX ADMIN — Medication 1500 MILLIGRAM(S): at 22:39

## 2020-03-31 RX ADMIN — Medication 100 MILLIGRAM(S): at 00:46

## 2020-03-31 RX ADMIN — Medication 650 MILLIGRAM(S): at 17:26

## 2020-03-31 RX ADMIN — Medication 1500 MILLIGRAM(S): at 00:32

## 2020-03-31 RX ADMIN — FAMOTIDINE 20 MILLIGRAM(S): 10 INJECTION INTRAVENOUS at 11:33

## 2020-03-31 RX ADMIN — SODIUM CHLORIDE 1000 MILLILITER(S): 9 INJECTION INTRAMUSCULAR; INTRAVENOUS; SUBCUTANEOUS at 18:15

## 2020-03-31 RX ADMIN — Medication 25 MICROGRAM(S): at 06:56

## 2020-03-31 RX ADMIN — Medication 1500 MILLIGRAM(S): at 11:33

## 2020-03-31 RX ADMIN — Medication 650 MILLIGRAM(S): at 22:39

## 2020-03-31 RX ADMIN — Medication 1500 MILLIGRAM(S): at 17:27

## 2020-03-31 RX ADMIN — ONDANSETRON 4 MILLIGRAM(S): 8 TABLET, FILM COATED ORAL at 09:02

## 2020-03-31 RX ADMIN — Medication 200 MILLIGRAM(S): at 06:58

## 2020-03-31 RX ADMIN — ONDANSETRON 4 MILLIGRAM(S): 8 TABLET, FILM COATED ORAL at 18:15

## 2020-03-31 RX ADMIN — Medication 650 MILLIGRAM(S): at 00:32

## 2020-03-31 RX ADMIN — Medication 650 MILLIGRAM(S): at 06:56

## 2020-03-31 RX ADMIN — AZITHROMYCIN 250 MILLIGRAM(S): 500 TABLET, FILM COATED ORAL at 11:33

## 2020-03-31 RX ADMIN — Medication 1500 MILLIGRAM(S): at 06:56

## 2020-03-31 RX ADMIN — Medication 200 MILLIGRAM(S): at 06:57

## 2020-03-31 RX ADMIN — Medication 650 MILLIGRAM(S): at 11:33

## 2020-03-31 NOTE — PROGRESS NOTE ADULT - PROBLEM SELECTOR PLAN 2
Patient presented with SOB and fevers, + COVID, still febrile overnight and requiring supplemental oxygen  - Isolation precautions; contact and isolation  - COVID +   - Monitor O2 saturation, monitor for respiratory distress  - Nasal cannula as needed, avoid HFNC/BiPAP  - Tylenol 650mg for fever   - Started ascorbic acid 1500mg IV q6h  - Started thiamine 200mg IV BID  - Started hydroxyxhloroquine 400mg IV BID x2 doses, then 200mg BID for 4 days  - Started azithromycin 250mg QD  - CRP and Ferritin uptrending

## 2020-03-31 NOTE — PROGRESS NOTE ADULT - PROBLEM SELECTOR PLAN 5
F: No fluids   E: Replete for K<4.0 and Mg<2.0   N: Regular Diet   DVT: None (Thrombocytopenia)   GI: Famotidine

## 2020-03-31 NOTE — PROGRESS NOTE ADULT - ASSESSMENT
Pt is a 70 y/o F with PMHx hypothyroidism, splenic artery aneurysm (s/p surgery), appendectomy, now admitted with Corona virus

## 2020-03-31 NOTE — PROGRESS NOTE ADULT - ATTENDING COMMENTS
Patient seen and examined at bedside. Agree with the history, physical exam, assessment, and plan as outlined above with the following addendum. Briefly patient is a 68 yo F  hypothyroidism, splenic artery aneurysm (s/p surgery), appendectomy, now admitted with CoVID 19    COVID 19- with hypoxia requiring supplemental oxygen  Fever curve downtrending. Patient is now requiring 2L NC, and will wean as tolerated   Labs significant for leukopenia, thrombocytopenia. CRP, ferritin elevated  -C/w covid treatment bundle: azithromycin, plaquenil, ascorbic acid, thiamine    Hyponatremia  -likely 2.2 hypovolemia, as pt has had poor appetite and is having diarrhea  -will do small fluid challenge of 500 cc NS and monitor    Diarrhea  likely 2/2 COVID 19, but will rule out other infectious etiologies- GI PCR, C diff

## 2020-03-31 NOTE — PROGRESS NOTE ADULT - PROBLEM SELECTOR PLAN 1
Patient presented with SOB, and with hypoxia 2/2 COVID + infection   - Currently on 3L NC, comfortable appearing   - Wean as tolerated

## 2020-03-31 NOTE — PROGRESS NOTE ADULT - SUBJECTIVE AND OBJECTIVE BOX
OVERNIGHT EVENTS: Patient spiked a fever to 101, was given Tylenol     SUBJECTIVE / INTERVAL HPI: Patient seen and examined at bedside.     VITAL SIGNS:  Vital Signs Last 24 Hrs  T(C): 37.1 (31 Mar 2020 09:23), Max: 38.9 (31 Mar 2020 06:35)  T(F): 98.8 (31 Mar 2020 09:23), Max: 102.1 (31 Mar 2020 06:35)  HR: 71 (31 Mar 2020 09:23) (67 - 73)  BP: 117/68 (31 Mar 2020 09:23) (117/68 - 145/54)  BP(mean): --  RR: 20 (31 Mar 2020 09:23) (16 - 20)  SpO2: 93% (31 Mar 2020 09:23) (92% - 97%)    REVIEW OF SYSTEMS:    CONSTITUTIONAL: No weakness, fevers or chills.   EYES/ENT: No visual changes;  No vertigo or throat pain   NECK: No pain or stiffness  RESPIRATORY: No cough, wheezing, hemoptysis; No shortness of breath  CARDIOVASCULAR: No chest pain or palpitations  GASTROINTESTINAL: No abdominal or epigastric pain. No nausea, vomiting, or hematemesis; No diarrhea or constipation. No melena or hematochezia.  GENITOURINARY: No dysuria, frequency or hematuria  NEUROLOGICAL: No numbness or weakness  SKIN: No itching, burning, rashes, or lesions   All other review of systems is negative unless indicated above.    PHYSICAL EXAM:  General: WDWN  HEENT: NC/AT; PERRL, clear conjunctiva  Neck: supple, no JVD,   Cardiovascular: +S1/S2; RRR, no M/R/G  Respiratory: CTA b/l; no W/R/R  Gastrointestinal: soft, NT/ND; +BSx4  Extremities: WWP; 2+ peripheral pulses; no edema   Neurological: AAOx3; no focal deficits    MEDICATIONS:  MEDICATIONS  (STANDING):  acetaminophen   Tablet .. 650 milliGRAM(s) Oral every 6 hours  ascorbic acid 1500 milliGRAM(s) Oral four times a day  azithromycin   Tablet 250 milliGRAM(s) Oral daily  famotidine    Tablet 20 milliGRAM(s) Oral daily  hydroxychloroquine 200 milliGRAM(s) Oral every 12 hours  levothyroxine 25 MICROGram(s) Oral daily  thiamine 200 milliGRAM(s) Oral <User Schedule>    MEDICATIONS  (PRN):  benzocaine 15 mG/menthol 3.6 mG (Sugar-Free) Lozenge 1 Lozenge Oral two times a day PRN Sore Throat  benzonatate 100 milliGRAM(s) Oral every 8 hours PRN Cough  guaiFENesin   Syrup  (Sugar-Free) 100 milliGRAM(s) Oral every 6 hours PRN Cough      ALLERGIES:  Allergies    erythromycin (Anaphylaxis; Hives)    Intolerances        LABS:                        12.4   3.62  )-----------( 56       ( 31 Mar 2020 09:09 )             37.7     03-30    137  |  100  |  9   ----------------------------<  99  4.7   |  25  |  0.80    Ca    8.6      30 Mar 2020 07:30  Phos  2.6     03-30  Mg     1.8     03-30    TPro  7.0  /  Alb  3.9  /  TBili  0.3  /  DBili  x   /  AST  63<H>  /  ALT  56<H>  /  AlkPhos  77  03-30    PT/INR - ( 30 Mar 2020 07:30 )   PT: 11.4 sec;   INR: 1.00          PTT - ( 30 Mar 2020 07:30 )  PTT:32.1 sec    CAPILLARY BLOOD GLUCOSE          RADIOLOGY & ADDITIONAL TESTS: Reviewed. OVERNIGHT EVENTS: Patient spiked a fever to 101, was given Tylenol     SUBJECTIVE / INTERVAL HPI: Patient seen and examined at bedside. Overall, patient feels subjectively better but still nauseous. Also reporting, the nausea improved from yesterday. Denies any SOB, chest pain.     VITAL SIGNS:  Vital Signs Last 24 Hrs  T(C): 37.1 (31 Mar 2020 09:23), Max: 38.9 (31 Mar 2020 06:35)  T(F): 98.8 (31 Mar 2020 09:23), Max: 102.1 (31 Mar 2020 06:35)  HR: 71 (31 Mar 2020 09:23) (67 - 73)  BP: 117/68 (31 Mar 2020 09:23) (117/68 - 145/54)  BP(mean): --  RR: 20 (31 Mar 2020 09:23) (16 - 20)  SpO2: 93% (31 Mar 2020 09:23) (92% - 97%)    REVIEW OF SYSTEMS:    All other review of systems is negative unless indicated above.    PHYSICAL EXAM:  General: WDWN  HEENT: NC/AT; PERRL, clear conjunctiva  Neck: supple, no JVD,   Cardiovascular: +S1/S2; RRR, no M/R/G  Respiratory: CTA b/l; no W/R/R  Gastrointestinal: soft, NT/ND; +BSx4  Extremities: WWP; 2+ peripheral pulses; no edema   Neurological: AAOx3; no focal deficits    MEDICATIONS:  MEDICATIONS  (STANDING):  acetaminophen   Tablet .. 650 milliGRAM(s) Oral every 6 hours  ascorbic acid 1500 milliGRAM(s) Oral four times a day  azithromycin   Tablet 250 milliGRAM(s) Oral daily  famotidine    Tablet 20 milliGRAM(s) Oral daily  hydroxychloroquine 200 milliGRAM(s) Oral every 12 hours  levothyroxine 25 MICROGram(s) Oral daily  thiamine 200 milliGRAM(s) Oral <User Schedule>    MEDICATIONS  (PRN):  benzocaine 15 mG/menthol 3.6 mG (Sugar-Free) Lozenge 1 Lozenge Oral two times a day PRN Sore Throat  benzonatate 100 milliGRAM(s) Oral every 8 hours PRN Cough  guaiFENesin   Syrup  (Sugar-Free) 100 milliGRAM(s) Oral every 6 hours PRN Cough      ALLERGIES:  Allergies    erythromycin (Anaphylaxis; Hives)    Intolerances        LABS:                        12.4   3.62  )-----------( 56       ( 31 Mar 2020 09:09 )             37.7     03-30    137  |  100  |  9   ----------------------------<  99  4.7   |  25  |  0.80    Ca    8.6      30 Mar 2020 07:30  Phos  2.6     03-30  Mg     1.8     03-30    TPro  7.0  /  Alb  3.9  /  TBili  0.3  /  DBili  x   /  AST  63<H>  /  ALT  56<H>  /  AlkPhos  77  03-30    PT/INR - ( 30 Mar 2020 07:30 )   PT: 11.4 sec;   INR: 1.00          PTT - ( 30 Mar 2020 07:30 )  PTT:32.1 sec    CAPILLARY BLOOD GLUCOSE          RADIOLOGY & ADDITIONAL TESTS: Reviewed.

## 2020-03-31 NOTE — PROGRESS NOTE ADULT - PROBLEM SELECTOR PLAN 3
Patient presented with Plt at 45 > 56 this AM, no active signs of bleeding, likely 2/2 viral infection (no anemia, or increased bilirubin, or signs of hemolysis)   - Monitor CBC   - Hold DVT prop.

## 2020-04-01 LAB
ALBUMIN SERPL ELPH-MCNC: 3.4 G/DL — SIGNIFICANT CHANGE UP (ref 3.3–5)
ALP SERPL-CCNC: 88 U/L — SIGNIFICANT CHANGE UP (ref 40–120)
ALT FLD-CCNC: 71 U/L — HIGH (ref 10–45)
ANION GAP SERPL CALC-SCNC: 13 MMOL/L — SIGNIFICANT CHANGE UP (ref 5–17)
AST SERPL-CCNC: 75 U/L — HIGH (ref 10–40)
BASOPHILS # BLD AUTO: 0 K/UL — SIGNIFICANT CHANGE UP (ref 0–0.2)
BASOPHILS NFR BLD AUTO: 0 % — SIGNIFICANT CHANGE UP (ref 0–2)
BILIRUB SERPL-MCNC: 0.3 MG/DL — SIGNIFICANT CHANGE UP (ref 0.2–1.2)
BUN SERPL-MCNC: 9 MG/DL — SIGNIFICANT CHANGE UP (ref 7–23)
CALCIUM SERPL-MCNC: 8.4 MG/DL — SIGNIFICANT CHANGE UP (ref 8.4–10.5)
CHLORIDE SERPL-SCNC: 99 MMOL/L — SIGNIFICANT CHANGE UP (ref 96–108)
CO2 SERPL-SCNC: 22 MMOL/L — SIGNIFICANT CHANGE UP (ref 22–31)
CREAT SERPL-MCNC: 0.91 MG/DL — SIGNIFICANT CHANGE UP (ref 0.5–1.3)
CRP SERPL-MCNC: 11.04 MG/DL — HIGH (ref 0–0.4)
D DIMER BLD IA.RAPID-MCNC: 552 NG/ML DDU — HIGH
EOSINOPHIL # BLD AUTO: 0 K/UL — SIGNIFICANT CHANGE UP (ref 0–0.5)
EOSINOPHIL NFR BLD AUTO: 0 % — SIGNIFICANT CHANGE UP (ref 0–6)
FERRITIN SERPL-MCNC: 2043 NG/ML — HIGH (ref 15–150)
GAMMA INTERFERON BACKGROUND BLD IA-ACNC: 0.05 IU/ML — SIGNIFICANT CHANGE UP
GLUCOSE SERPL-MCNC: 104 MG/DL — HIGH (ref 70–99)
HCT VFR BLD CALC: 37.7 % — SIGNIFICANT CHANGE UP (ref 34.5–45)
HGB BLD-MCNC: 12.5 G/DL — SIGNIFICANT CHANGE UP (ref 11.5–15.5)
LYMPHOCYTES # BLD AUTO: 0.53 K/UL — LOW (ref 1–3.3)
LYMPHOCYTES # BLD AUTO: 13.5 % — SIGNIFICANT CHANGE UP (ref 13–44)
M TB IFN-G BLD-IMP: NEGATIVE — SIGNIFICANT CHANGE UP
M TB IFN-G CD4+ BCKGRND COR BLD-ACNC: 0.01 IU/ML — SIGNIFICANT CHANGE UP
M TB IFN-G CD4+CD8+ BCKGRND COR BLD-ACNC: 0.01 IU/ML — SIGNIFICANT CHANGE UP
MAGNESIUM SERPL-MCNC: 1.9 MG/DL — SIGNIFICANT CHANGE UP (ref 1.6–2.6)
MCHC RBC-ENTMCNC: 31.3 PG — SIGNIFICANT CHANGE UP (ref 27–34)
MCHC RBC-ENTMCNC: 33.2 GM/DL — SIGNIFICANT CHANGE UP (ref 32–36)
MCV RBC AUTO: 94.3 FL — SIGNIFICANT CHANGE UP (ref 80–100)
MONOCYTES # BLD AUTO: 0.07 K/UL — SIGNIFICANT CHANGE UP (ref 0–0.9)
MONOCYTES NFR BLD AUTO: 1.8 % — LOW (ref 2–14)
NEUTROPHILS # BLD AUTO: 2.92 K/UL — SIGNIFICANT CHANGE UP (ref 1.8–7.4)
NEUTROPHILS NFR BLD AUTO: 68.5 % — SIGNIFICANT CHANGE UP (ref 43–77)
PHOSPHATE SERPL-MCNC: 2.7 MG/DL — SIGNIFICANT CHANGE UP (ref 2.5–4.5)
PLATELET # BLD AUTO: 60 K/UL — LOW (ref 150–400)
POTASSIUM SERPL-MCNC: 4.3 MMOL/L — SIGNIFICANT CHANGE UP (ref 3.5–5.3)
POTASSIUM SERPL-SCNC: 4.3 MMOL/L — SIGNIFICANT CHANGE UP (ref 3.5–5.3)
PROT SERPL-MCNC: 6.5 G/DL — SIGNIFICANT CHANGE UP (ref 6–8.3)
QUANT TB PLUS MITOGEN MINUS NIL: 3.81 IU/ML — SIGNIFICANT CHANGE UP
RBC # BLD: 4 M/UL — SIGNIFICANT CHANGE UP (ref 3.8–5.2)
RBC # FLD: 13.4 % — SIGNIFICANT CHANGE UP (ref 10.3–14.5)
SODIUM SERPL-SCNC: 134 MMOL/L — LOW (ref 135–145)
WBC # BLD: 3.9 K/UL — SIGNIFICANT CHANGE UP (ref 3.8–10.5)
WBC # FLD AUTO: 3.9 K/UL — SIGNIFICANT CHANGE UP (ref 3.8–10.5)

## 2020-04-01 PROCEDURE — 99233 SBSQ HOSP IP/OBS HIGH 50: CPT | Mod: GC

## 2020-04-01 RX ORDER — ACETAMINOPHEN 500 MG
650 TABLET ORAL EVERY 6 HOURS
Refills: 0 | Status: DISCONTINUED | OUTPATIENT
Start: 2020-04-01 | End: 2020-04-07

## 2020-04-01 RX ORDER — TOCILIZUMAB 20 MG/ML
400 INJECTION, SOLUTION, CONCENTRATE INTRAVENOUS ONCE
Refills: 0 | Status: COMPLETED | OUTPATIENT
Start: 2020-04-01 | End: 2020-04-01

## 2020-04-01 RX ORDER — ONDANSETRON 8 MG/1
4 TABLET, FILM COATED ORAL EVERY 6 HOURS
Refills: 0 | Status: DISCONTINUED | OUTPATIENT
Start: 2020-04-01 | End: 2020-04-07

## 2020-04-01 RX ADMIN — ONDANSETRON 4 MILLIGRAM(S): 8 TABLET, FILM COATED ORAL at 18:56

## 2020-04-01 RX ADMIN — Medication 40 MILLIGRAM(S): at 18:18

## 2020-04-01 RX ADMIN — Medication 650 MILLIGRAM(S): at 11:25

## 2020-04-01 RX ADMIN — Medication 1500 MILLIGRAM(S): at 05:05

## 2020-04-01 RX ADMIN — Medication 200 MILLIGRAM(S): at 05:06

## 2020-04-01 RX ADMIN — Medication 650 MILLIGRAM(S): at 18:32

## 2020-04-01 RX ADMIN — Medication 1500 MILLIGRAM(S): at 18:18

## 2020-04-01 RX ADMIN — ONDANSETRON 4 MILLIGRAM(S): 8 TABLET, FILM COATED ORAL at 11:25

## 2020-04-01 RX ADMIN — Medication 200 MILLIGRAM(S): at 18:18

## 2020-04-01 RX ADMIN — Medication 1500 MILLIGRAM(S): at 11:25

## 2020-04-01 RX ADMIN — Medication 200 MILLIGRAM(S): at 19:32

## 2020-04-01 RX ADMIN — AZITHROMYCIN 250 MILLIGRAM(S): 500 TABLET, FILM COATED ORAL at 11:26

## 2020-04-01 RX ADMIN — Medication 1500 MILLIGRAM(S): at 23:19

## 2020-04-01 RX ADMIN — Medication 650 MILLIGRAM(S): at 05:05

## 2020-04-01 RX ADMIN — Medication 200 MILLIGRAM(S): at 05:05

## 2020-04-01 RX ADMIN — FAMOTIDINE 20 MILLIGRAM(S): 10 INJECTION INTRAVENOUS at 11:25

## 2020-04-01 RX ADMIN — TOCILIZUMAB 100 MILLIGRAM(S): 20 INJECTION, SOLUTION, CONCENTRATE INTRAVENOUS at 18:23

## 2020-04-01 RX ADMIN — Medication 25 MICROGRAM(S): at 05:06

## 2020-04-01 NOTE — PROGRESS NOTE ADULT - ATTENDING COMMENTS
Agree with the history, physical exam, assessment, and plan as outlined above with the following addendum. Briefly patient is a 70 yo F with hypothyroidism who presents with hypoxic respiratory failure 2/2 COVID19    #covid 19  Patient has been treated with plaquenil and azithromycin since March 30. She has remained persistently febrile and still requiring between 4 to 6L NC, with desaturations to 83%on RA. CRP is also slightly rising today. Discussion had with pulm, and will start Toci and IV steroids. Toci can take a few days to work, so if resp status worsens, can also consider diuresis as well    #Hyponatremia  -Na stable at 134  -will avoid IVF given covid 19 status, goal is to keep patients on dryer side    #Diarrhea  -resolving  -GI PCR neg

## 2020-04-01 NOTE — PROGRESS NOTE ADULT - PROBLEM SELECTOR PLAN 2
Patient presented with SOB and fevers, + COVID, still febrile overnight and requiring supplemental oxygen  - Isolation precautions; contact and isolation  - COVID +   - Monitor O2 saturation, monitor for respiratory distress  - Nasal cannula as needed, avoid HFNC/BiPAP  - Tylenol 650mg for fever   - Started ascorbic acid 1500mg IV q6h  - Started thiamine 200mg IV BID  - Started hydroxyxhloroquine 400mg IV BID x2 doses, then 200mg BID for 4 days  - Started azithromycin 250mg QD  - CRP and Ferritin uptrending Patient presented with SOB and fevers, + COVID, still febrile overnight and requiring supplemental oxygen  - Isolation precautions; contact and isolation  - COVID +   - Monitor O2 saturation, monitor for respiratory distress  - Nasal cannula as needed, avoid HFNC/BiPAP  - Tylenol 650mg for fever   - C/w ascorbic acid 1500mg IV q6h, thiamine 200mg IV BID, hydroxyxhloroquine 400mg IV BID x2 doses, then 200mg BID for 4 days, azithromycin 250mg QD  - CRP and Ferritin uptrending

## 2020-04-01 NOTE — PROGRESS NOTE ADULT - SUBJECTIVE AND OBJECTIVE BOX
OVERNIGHT EVENTS:    SUBJECTIVE / INTERVAL HPI: Patient seen and examined at bedside.     VITAL SIGNS:  Vital Signs Last 24 Hrs  T(C): 37.7 (01 Apr 2020 09:10), Max: 39.4 (01 Apr 2020 05:38)  T(F): 99.8 (01 Apr 2020 09:10), Max: 103 (01 Apr 2020 05:38)  HR: 65 (01 Apr 2020 09:10) (61 - 79)  BP: 107/63 (01 Apr 2020 09:10) (107/63 - 128/75)  BP(mean): 77 (01 Apr 2020 09:10) (62 - 77)  RR: 21 (01 Apr 2020 09:10) (20 - 22)  SpO2: 93% (01 Apr 2020 09:10) (84% - 94%)    PHYSICAL EXAM:    General: WDWN  HEENT: NC/AT; PERRL, anicteric sclera; MMM  Neck: supple  Cardiovascular: +S1/S2; RRR  Respiratory: CTA B/L; no W/R/R  Gastrointestinal: soft, NT/ND; +BSx4  Extremities: WWP; no edema, clubbing or cyanosis  Vascular: 2+ radial, DP/PT pulses B/L  Neurological: AAOx3; no focal deficits    MEDICATIONS:  MEDICATIONS  (STANDING):  acetaminophen   Tablet .. 650 milliGRAM(s) Oral every 6 hours  ascorbic acid 1500 milliGRAM(s) Oral four times a day  azithromycin   Tablet 250 milliGRAM(s) Oral daily  famotidine    Tablet 20 milliGRAM(s) Oral daily  hydroxychloroquine 200 milliGRAM(s) Oral every 12 hours  levothyroxine 25 MICROGram(s) Oral daily  thiamine 200 milliGRAM(s) Oral <User Schedule>    MEDICATIONS  (PRN):  benzocaine 15 mG/menthol 3.6 mG (Sugar-Free) Lozenge 1 Lozenge Oral two times a day PRN Sore Throat  benzonatate 100 milliGRAM(s) Oral every 8 hours PRN Cough  guaiFENesin   Syrup  (Sugar-Free) 100 milliGRAM(s) Oral every 6 hours PRN Cough      ALLERGIES:  Allergies    erythromycin (Anaphylaxis; Hives)    Intolerances        LABS:                        12.4   3.62  )-----------( 56       ( 31 Mar 2020 09:09 )             37.7     03-31    133<L>  |  97  |  10  ----------------------------<  96  4.2   |  24  |  0.85    Ca    8.6      31 Mar 2020 09:09  Phos  2.5     03-31  Mg     2.0     03-31    TPro  6.7  /  Alb  3.5  /  TBili  0.3  /  DBili  x   /  AST  80<H>  /  ALT  66<H>  /  AlkPhos  77  03-31        CAPILLARY BLOOD GLUCOSE          RADIOLOGY & ADDITIONAL TESTS: Reviewed. OVERNIGHT EVENTS: Continuing to spike F overnight to 100.5F at 1045p & 103F at 540a. O2 reqs increasing this AM from 4 to 6L, saturating 93%.    SUBJECTIVE / INTERVAL HPI: Patient seen and examined at bedside. Resting comfortably in bed, continuing to saturate well on 6L; attempted to wean to 2L with desaturation to 89%. Continuing to experience watery diarrhea (GI PCR neg overnight, to consider sending C. diff if w/ profuse diarrhea today). Ordered for maalox & zofran per patient request for abd discomfort & nausea.  --saturating 93% on 4L O2 NC    VITAL SIGNS:  Vital Signs Last 24 Hrs  T(C): 37.7 (01 Apr 2020 09:10), Max: 39.4 (01 Apr 2020 05:38)  T(F): 99.8 (01 Apr 2020 09:10), Max: 103 (01 Apr 2020 05:38)  HR: 65 (01 Apr 2020 09:10) (61 - 79)  BP: 107/63 (01 Apr 2020 09:10) (107/63 - 128/75)  BP(mean): 77 (01 Apr 2020 09:10) (62 - 77)  RR: 21 (01 Apr 2020 09:10) (20 - 22)  SpO2: 93% (01 Apr 2020 09:10) (84% - 94%)    PHYSICAL EXAM:    General: WDWN  HEENT: NC/AT; PERRL, clear conjunctiva  Neck: supple, no JVD,   Cardiovascular: +S1/S2; RRR, no M/R/G  Respiratory: CTA b/l; no W/R/R  Gastrointestinal: soft, NT/ND; +BSx4  Extremities: WWP; 2+ peripheral pulses; no edema   Neurological: AAOx3; no focal deficits    MEDICATIONS:  MEDICATIONS  (STANDING):  acetaminophen   Tablet .. 650 milliGRAM(s) Oral every 6 hours  ascorbic acid 1500 milliGRAM(s) Oral four times a day  azithromycin   Tablet 250 milliGRAM(s) Oral daily  famotidine    Tablet 20 milliGRAM(s) Oral daily  hydroxychloroquine 200 milliGRAM(s) Oral every 12 hours  levothyroxine 25 MICROGram(s) Oral daily  thiamine 200 milliGRAM(s) Oral <User Schedule>    MEDICATIONS  (PRN):  benzocaine 15 mG/menthol 3.6 mG (Sugar-Free) Lozenge 1 Lozenge Oral two times a day PRN Sore Throat  benzonatate 100 milliGRAM(s) Oral every 8 hours PRN Cough  guaiFENesin   Syrup  (Sugar-Free) 100 milliGRAM(s) Oral every 6 hours PRN Cough      ALLERGIES:  Allergies    erythromycin (Anaphylaxis; Hives)    Intolerances        LABS:                        12.4   3.62  )-----------( 56       ( 31 Mar 2020 09:09 )             37.7     03-31    133<L>  |  97  |  10  ----------------------------<  96  4.2   |  24  |  0.85    Ca    8.6      31 Mar 2020 09:09  Phos  2.5     03-31  Mg     2.0     03-31    TPro  6.7  /  Alb  3.5  /  TBili  0.3  /  DBili  x   /  AST  80<H>  /  ALT  66<H>  /  AlkPhos  77  03-31        CAPILLARY BLOOD GLUCOSE          RADIOLOGY & ADDITIONAL TESTS: Reviewed.

## 2020-04-01 NOTE — PROGRESS NOTE ADULT - PROBLEM SELECTOR PLAN 3
Patient presented with Plt at 45 > 56 this AM, no active signs of bleeding, likely 2/2 viral infection (no anemia, or increased bilirubin, or signs of hemolysis)   - Monitor CBC   - Hold DVT prop. Patient presented with Plt at 45 > 56 yesterday AM, no active signs of bleeding, likely 2/2 viral infection (no anemia, or increased bilirubin, or signs of hemolysis)   - Monitor CBC   - Hold DVT ppx

## 2020-04-01 NOTE — PROGRESS NOTE ADULT - ASSESSMENT
Pt is a 70 y/o F with PMHx hypothyroidism, splenic artery aneurysm (s/p surgery), appendectomy, now admitted with Corona virus Pt is a 70 y/o F with PMHx hypothyroidism, splenic artery aneurysm (s/p surgery), appendectomy, now admitted with COVID+.

## 2020-04-02 LAB
ALBUMIN SERPL ELPH-MCNC: 3.9 G/DL — SIGNIFICANT CHANGE UP (ref 3.3–5)
ALP SERPL-CCNC: 105 U/L — SIGNIFICANT CHANGE UP (ref 40–120)
ALT FLD-CCNC: 76 U/L — HIGH (ref 10–45)
ANION GAP SERPL CALC-SCNC: 12 MMOL/L — SIGNIFICANT CHANGE UP (ref 5–17)
AST SERPL-CCNC: 68 U/L — HIGH (ref 10–40)
BASOPHILS # BLD AUTO: 0 K/UL — SIGNIFICANT CHANGE UP (ref 0–0.2)
BASOPHILS NFR BLD AUTO: 0 % — SIGNIFICANT CHANGE UP (ref 0–2)
BILIRUB SERPL-MCNC: 0.4 MG/DL — SIGNIFICANT CHANGE UP (ref 0.2–1.2)
BUN SERPL-MCNC: 15 MG/DL — SIGNIFICANT CHANGE UP (ref 7–23)
C DIFF GDH STL QL: NEGATIVE — SIGNIFICANT CHANGE UP
C DIFF GDH STL QL: SIGNIFICANT CHANGE UP
CALCIUM SERPL-MCNC: 9 MG/DL — SIGNIFICANT CHANGE UP (ref 8.4–10.5)
CHLORIDE SERPL-SCNC: 101 MMOL/L — SIGNIFICANT CHANGE UP (ref 96–108)
CO2 SERPL-SCNC: 23 MMOL/L — SIGNIFICANT CHANGE UP (ref 22–31)
CREAT SERPL-MCNC: 0.86 MG/DL — SIGNIFICANT CHANGE UP (ref 0.5–1.3)
CRP SERPL-MCNC: 10.8 MG/DL — HIGH (ref 0–0.4)
D DIMER BLD IA.RAPID-MCNC: 694 NG/ML DDU — HIGH
EOSINOPHIL # BLD AUTO: 0 K/UL — SIGNIFICANT CHANGE UP (ref 0–0.5)
EOSINOPHIL NFR BLD AUTO: 0 % — SIGNIFICANT CHANGE UP (ref 0–6)
FERRITIN SERPL-MCNC: 2385 NG/ML — HIGH (ref 15–150)
GLUCOSE SERPL-MCNC: 142 MG/DL — HIGH (ref 70–99)
HCT VFR BLD CALC: 39.1 % — SIGNIFICANT CHANGE UP (ref 34.5–45)
HGB BLD-MCNC: 13.3 G/DL — SIGNIFICANT CHANGE UP (ref 11.5–15.5)
LYMPHOCYTES # BLD AUTO: 0.23 K/UL — LOW (ref 1–3.3)
LYMPHOCYTES # BLD AUTO: 11.7 % — LOW (ref 13–44)
MAGNESIUM SERPL-MCNC: 2.2 MG/DL — SIGNIFICANT CHANGE UP (ref 1.6–2.6)
MCHC RBC-ENTMCNC: 31.7 PG — SIGNIFICANT CHANGE UP (ref 27–34)
MCHC RBC-ENTMCNC: 34 GM/DL — SIGNIFICANT CHANGE UP (ref 32–36)
MCV RBC AUTO: 93.3 FL — SIGNIFICANT CHANGE UP (ref 80–100)
MONOCYTES # BLD AUTO: 0.11 K/UL — SIGNIFICANT CHANGE UP (ref 0–0.9)
MONOCYTES NFR BLD AUTO: 5.4 % — SIGNIFICANT CHANGE UP (ref 2–14)
MRSA PCR RESULT.: NEGATIVE — SIGNIFICANT CHANGE UP
NEUTROPHILS # BLD AUTO: 1.54 K/UL — LOW (ref 1.8–7.4)
NEUTROPHILS NFR BLD AUTO: 67.6 % — SIGNIFICANT CHANGE UP (ref 43–77)
NRBC # BLD: SIGNIFICANT CHANGE UP /100 WBCS (ref 0–0)
PHOSPHATE SERPL-MCNC: 3.3 MG/DL — SIGNIFICANT CHANGE UP (ref 2.5–4.5)
PLATELET # BLD AUTO: 86 K/UL — LOW (ref 150–400)
POTASSIUM SERPL-MCNC: 4.7 MMOL/L — SIGNIFICANT CHANGE UP (ref 3.5–5.3)
POTASSIUM SERPL-SCNC: 4.7 MMOL/L — SIGNIFICANT CHANGE UP (ref 3.5–5.3)
PROT SERPL-MCNC: 7.5 G/DL — SIGNIFICANT CHANGE UP (ref 6–8.3)
RBC # BLD: 4.19 M/UL — SIGNIFICANT CHANGE UP (ref 3.8–5.2)
RBC # FLD: 13.6 % — SIGNIFICANT CHANGE UP (ref 10.3–14.5)
S AUREUS DNA NOSE QL NAA+PROBE: POSITIVE
SODIUM SERPL-SCNC: 136 MMOL/L — SIGNIFICANT CHANGE UP (ref 135–145)
WBC # BLD: 1.97 K/UL — LOW (ref 3.8–10.5)
WBC # FLD AUTO: 1.97 K/UL — LOW (ref 3.8–10.5)

## 2020-04-02 PROCEDURE — 99233 SBSQ HOSP IP/OBS HIGH 50: CPT | Mod: GC

## 2020-04-02 RX ORDER — PIPERACILLIN AND TAZOBACTAM 4; .5 G/20ML; G/20ML
4.5 INJECTION, POWDER, LYOPHILIZED, FOR SOLUTION INTRAVENOUS EVERY 6 HOURS
Refills: 0 | Status: DISCONTINUED | OUTPATIENT
Start: 2020-04-02 | End: 2020-04-05

## 2020-04-02 RX ORDER — VANCOMYCIN HCL 1 G
1250 VIAL (EA) INTRAVENOUS EVERY 12 HOURS
Refills: 0 | Status: DISCONTINUED | OUTPATIENT
Start: 2020-04-02 | End: 2020-04-02

## 2020-04-02 RX ADMIN — Medication 1500 MILLIGRAM(S): at 22:31

## 2020-04-02 RX ADMIN — PIPERACILLIN AND TAZOBACTAM 200 GRAM(S): 4; .5 INJECTION, POWDER, LYOPHILIZED, FOR SOLUTION INTRAVENOUS at 22:31

## 2020-04-02 RX ADMIN — Medication 40 MILLIGRAM(S): at 17:18

## 2020-04-02 RX ADMIN — Medication 166.67 MILLIGRAM(S): at 16:09

## 2020-04-02 RX ADMIN — Medication 200 MILLIGRAM(S): at 17:18

## 2020-04-02 RX ADMIN — Medication 200 MILLIGRAM(S): at 05:50

## 2020-04-02 RX ADMIN — FAMOTIDINE 20 MILLIGRAM(S): 10 INJECTION INTRAVENOUS at 09:18

## 2020-04-02 RX ADMIN — Medication 40 MILLIGRAM(S): at 05:48

## 2020-04-02 RX ADMIN — Medication 200 MILLIGRAM(S): at 05:56

## 2020-04-02 RX ADMIN — PIPERACILLIN AND TAZOBACTAM 200 GRAM(S): 4; .5 INJECTION, POWDER, LYOPHILIZED, FOR SOLUTION INTRAVENOUS at 15:16

## 2020-04-02 RX ADMIN — Medication 1500 MILLIGRAM(S): at 17:18

## 2020-04-02 RX ADMIN — Medication 25 MICROGRAM(S): at 05:48

## 2020-04-02 RX ADMIN — Medication 1500 MILLIGRAM(S): at 09:18

## 2020-04-02 RX ADMIN — AZITHROMYCIN 250 MILLIGRAM(S): 500 TABLET, FILM COATED ORAL at 09:18

## 2020-04-02 RX ADMIN — Medication 1500 MILLIGRAM(S): at 05:48

## 2020-04-02 NOTE — PROGRESS NOTE ADULT - SUBJECTIVE AND OBJECTIVE BOX
ON: febrile to 102.6 in the evening and requiring 6L NC     This morning patient is sitting up at side of bed. has SOB and cough. stable on 6L NC currently. Had diarrhea yesterday     C-diff negative     azithromycin   Tablet 250  hydroxychloroquine 200  azithromycin   Tablet 250  hydroxychloroquine 200      Allergies    erythromycin (Anaphylaxis; Hives)    Intolerances        Vital Signs Last 24 Hrs  T(C): 36.5 (02 Apr 2020 09:39), Max: 39.2 (01 Apr 2020 18:32)  T(F): 97.7 (02 Apr 2020 09:39), Max: 102.6 (01 Apr 2020 18:32)  HR: 65 (02 Apr 2020 09:39) (65 - 74)  BP: 101/69 (02 Apr 2020 09:39) (83/47 - 106/66)  BP(mean): --  RR: 16 (02 Apr 2020 09:39) (16 - 19)  SpO2: 94% (02 Apr 2020 09:39) (83% - 97%)  I&O's Summary      PHYSICAL EXAM:    General: WDWN  HEENT: PERRL, clear conjunctiva  Neck: supple, no JVD,   Cardiovascular: +S1/S2; RRR,   Respiratory: bilateral crackles at bases   Gastrointestinal: soft, NT/ND; +BSx4  Extremities: WWP; 2+ peripheral pulses; no edema   Neurological: AAOx3; no focal deficits    LABS:                        12.5   3.90  )-----------( 60       ( 01 Apr 2020 09:01 )             37.7     04-01    134<L>  |  99  |  9   ----------------------------<  104<H>  4.3   |  22  |  0.91    Ca    8.4      01 Apr 2020 09:01  Phos  2.7     04-01  Mg     1.9     04-01    TPro  6.5  /  Alb  3.4  /  TBili  0.3  /  DBili  x   /  AST  75<H>  /  ALT  71<H>  /  AlkPhos  88  04-01        Radiology and Additional Studies:

## 2020-04-02 NOTE — DIETITIAN INITIAL EVALUATION ADULT. - PERTINENT MEDS FT
zosyn, vancomycin, Plaquenil, zithromax, tylenol, solu-medrol, zofran, vitamin C, pepcid, synthroid, thiamine

## 2020-04-02 NOTE — PROGRESS NOTE ADULT - PROBLEM SELECTOR PLAN 1
Patient presented with SOB, and with hypoxia 2/2 COVID + infection   - Currently on 3L NC, comfortable appearing   - Wean as tolerated Patient presented with SOB, and with hypoxia 2/2 COVID + infection   - Currently on 6L NC, slightly more short of breath   - Wean as tolerated

## 2020-04-02 NOTE — PROGRESS NOTE ADULT - ATTENDING COMMENTS
Agree with the history, physical exam, assessment, and plan as outlined above with the following addendum. Briefly patient is a 70 yo F with hypothyroidism who presents with hypoxic respiratory failure 2/2 COVID19    #covid 19  -with hypoxia requiring 6 LNC  Case discussed with pulm and decision was made to start IV steroids and Toci ( 4/1). Although still on 6L NC, her clinical status remains stable. CRP stable.   Today, leukopenia and bandemia noted. Will start Vanc, zosyn to cover for possible super imposed bacterial infection. F/u blood cultures   Case discussed with Dr. Abreu     #Hyponatremia - resolved Agree with the history, physical exam, assessment, and plan as outlined above with the following addendum. Briefly patient is a 68 yo F with hypothyroidism who presents with hypoxic respiratory failure 2/2 COVID19    #covid 19  With hypoxia requiring 4 to 6 LNC  On azithro, plaquenil, IV steroids. Received Toci on 4/1. Overall resp status has been stable since starting toci and steroids  On Zosyn for possible super imposed bacterial infection given bandemia on 4/2, which has since resolved since starting IV abc. MRSA pcr neg, blood cultures neg  Monitor D-dimer, if continue to rise, consider ordering dopplers   Case discussed with Dr. Abreu

## 2020-04-02 NOTE — PROGRESS NOTE ADULT - ASSESSMENT
Pt is a 70 y/o F with PMHx hypothyroidism, splenic artery aneurysm (s/p surgery), appendectomy, now admitted with COVID+.

## 2020-04-02 NOTE — PROGRESS NOTE ADULT - PROBLEM SELECTOR PLAN 2
Patient presented with SOB and fevers, + COVID, still febrile overnight and requiring supplemental oxygen  - Isolation precautions; contact and isolation  - COVID +   - Monitor O2 saturation, monitor for respiratory distress  - Nasal cannula as needed, avoid HFNC/BiPAP  - Tylenol 650mg for fever   - C/w ascorbic acid 1500mg IV q6h, thiamine 200mg IV BID, hydroxyxhloroquine 400mg IV BID x2 doses, then 200mg BID for 4 days, azithromycin 250mg QD  - CRP and Ferritin uptrending Patient presented with SOB and fevers, + COVID, still febrile overnight and requiring supplemental oxygen  - Isolation precautions; contact and isolation  - COVID +   - Monitor O2 saturation, monitor for respiratory distress  - Nasal cannula as needed, avoid HFNC/BiPAP  - Tylenol 650mg for fever   - C/w ascorbic acid 1500mg IV q6h, thiamine 200mg IV BID, hydroxyxhloroquine 400mg IV BID x2 doses, then 200mg BID for 4 days, azithromycin 250mg QD  -Solumedrol started 4/1 and Tocilizamab (4/1)   - CRP and Ferritin uptrending  -monitor fever curve

## 2020-04-02 NOTE — DIETITIAN INITIAL EVALUATION ADULT. - OTHER INFO
68 y/o F with PMHx hypothyroidism, splenic artery aneurysm (s/p surgery), appendectomy, now admitted with COVID+. Unable to conduct a face to face interview or nutrition-focused physical exam due to limited contact restrictions related to their medical condition and isolation precautions, spoke with pt via phone. Pt states she feels "so-so," denies N/V, last BM 4/1 (pt states she has been having diarrhea for a couple of days). Chago score 21. Pt states her appetite has been decreased during admission, consuming <50% of meals at this time. Pt states her appetite is much better PTA, follows kosher diet, denies food allergies. Pt states her UBW is 210-214lb, pt denies recent weight changes PTA. Encouraged increased PO intake with emphasis on lean protein, pt states she does not like protein shakes and does not want them but will try to consume more of her meals. RD to monitor and f/u per protocol.

## 2020-04-02 NOTE — DIETITIAN INITIAL EVALUATION ADULT. - ENERGY NEEDS
Ht (4/2): 167.6cm, Wt (4/2): 97.5kg, IBW: 59.1kg+/-10%, %IBW: 165%, BMI: 34.7   IBW used to calculate energy needs due to pt's current body weight exceeding 120% of IBW. Needs adjusted for infection, fever, overweight status. Fluid needs per team.

## 2020-04-03 LAB
ALBUMIN SERPL ELPH-MCNC: 3.6 G/DL — SIGNIFICANT CHANGE UP (ref 3.3–5)
ALP SERPL-CCNC: 89 U/L — SIGNIFICANT CHANGE UP (ref 40–120)
ALT FLD-CCNC: 72 U/L — HIGH (ref 10–45)
ANION GAP SERPL CALC-SCNC: 13 MMOL/L — SIGNIFICANT CHANGE UP (ref 5–17)
ANISOCYTOSIS BLD QL: SLIGHT — SIGNIFICANT CHANGE UP
AST SERPL-CCNC: 51 U/L — HIGH (ref 10–40)
BASOPHILS # BLD AUTO: 0.02 K/UL — SIGNIFICANT CHANGE UP (ref 0–0.2)
BASOPHILS NFR BLD AUTO: 0.4 % — SIGNIFICANT CHANGE UP (ref 0–2)
BILIRUB SERPL-MCNC: 0.4 MG/DL — SIGNIFICANT CHANGE UP (ref 0.2–1.2)
BUN SERPL-MCNC: 21 MG/DL — SIGNIFICANT CHANGE UP (ref 7–23)
CALCIUM SERPL-MCNC: 9.2 MG/DL — SIGNIFICANT CHANGE UP (ref 8.4–10.5)
CHLORIDE SERPL-SCNC: 102 MMOL/L — SIGNIFICANT CHANGE UP (ref 96–108)
CO2 SERPL-SCNC: 22 MMOL/L — SIGNIFICANT CHANGE UP (ref 22–31)
CREAT SERPL-MCNC: 0.97 MG/DL — SIGNIFICANT CHANGE UP (ref 0.5–1.3)
CRP SERPL-MCNC: 4.03 MG/DL — HIGH (ref 0–0.4)
CULTURE RESULTS: NO GROWTH — SIGNIFICANT CHANGE UP
CULTURE RESULTS: NO GROWTH — SIGNIFICANT CHANGE UP
D DIMER BLD IA.RAPID-MCNC: 719 NG/ML DDU — HIGH
EOSINOPHIL # BLD AUTO: 0 K/UL — SIGNIFICANT CHANGE UP (ref 0–0.5)
EOSINOPHIL NFR BLD AUTO: 0 % — SIGNIFICANT CHANGE UP (ref 0–6)
FERRITIN SERPL-MCNC: 2233 NG/ML — HIGH (ref 15–150)
GLUCOSE SERPL-MCNC: 112 MG/DL — HIGH (ref 70–99)
HCT VFR BLD CALC: 37 % — SIGNIFICANT CHANGE UP (ref 34.5–45)
HGB BLD-MCNC: 12.2 G/DL — SIGNIFICANT CHANGE UP (ref 11.5–15.5)
IMM GRANULOCYTES NFR BLD AUTO: 4.2 % — HIGH (ref 0–1.5)
LYMPHOCYTES # BLD AUTO: 0.82 K/UL — LOW (ref 1–3.3)
LYMPHOCYTES # BLD AUTO: 12 % — LOW (ref 13–44)
LYMPHOCYTES # BLD AUTO: 15.6 % — SIGNIFICANT CHANGE UP (ref 13–44)
MACROCYTES BLD QL: SLIGHT — SIGNIFICANT CHANGE UP
MAGNESIUM SERPL-MCNC: 2.1 MG/DL — SIGNIFICANT CHANGE UP (ref 1.6–2.6)
MANUAL SMEAR VERIFICATION: SIGNIFICANT CHANGE UP
MCHC RBC-ENTMCNC: 31.1 PG — SIGNIFICANT CHANGE UP (ref 27–34)
MCHC RBC-ENTMCNC: 33 GM/DL — SIGNIFICANT CHANGE UP (ref 32–36)
MCV RBC AUTO: 94.4 FL — SIGNIFICANT CHANGE UP (ref 80–100)
METAMYELOCYTES # FLD: 3 % — HIGH
MONOCYTES # BLD AUTO: 0.25 K/UL — SIGNIFICANT CHANGE UP (ref 0–0.9)
MONOCYTES NFR BLD AUTO: 1 % — LOW (ref 2–14)
MONOCYTES NFR BLD AUTO: 4.8 % — SIGNIFICANT CHANGE UP (ref 2–14)
MYELOCYTES NFR BLD: 1 % — HIGH
NEUTROPHILS # BLD AUTO: 3.95 K/UL — SIGNIFICANT CHANGE UP (ref 1.8–7.4)
NEUTROPHILS NFR BLD AUTO: 75 % — SIGNIFICANT CHANGE UP (ref 43–77)
NEUTROPHILS NFR BLD AUTO: 81 % — HIGH (ref 43–77)
NEUTS BAND # BLD: 2 % — SIGNIFICANT CHANGE UP
NRBC # BLD: 0 /100 WBCS — SIGNIFICANT CHANGE UP (ref 0–0)
OVALOCYTES BLD QL SMEAR: SLIGHT — SIGNIFICANT CHANGE UP
PHOSPHATE SERPL-MCNC: 3.4 MG/DL — SIGNIFICANT CHANGE UP (ref 2.5–4.5)
PLAT MORPH BLD: NORMAL — SIGNIFICANT CHANGE UP
PLATELET # BLD AUTO: 123 K/UL — LOW (ref 150–400)
POIKILOCYTOSIS BLD QL AUTO: SLIGHT — SIGNIFICANT CHANGE UP
POLYCHROMASIA BLD QL SMEAR: SLIGHT — SIGNIFICANT CHANGE UP
POTASSIUM SERPL-MCNC: 4.5 MMOL/L — SIGNIFICANT CHANGE UP (ref 3.5–5.3)
POTASSIUM SERPL-SCNC: 4.5 MMOL/L — SIGNIFICANT CHANGE UP (ref 3.5–5.3)
PROT SERPL-MCNC: 6.6 G/DL — SIGNIFICANT CHANGE UP (ref 6–8.3)
RBC # BLD: 3.92 M/UL — SIGNIFICANT CHANGE UP (ref 3.8–5.2)
RBC # FLD: 13.5 % — SIGNIFICANT CHANGE UP (ref 10.3–14.5)
RBC BLD AUTO: ABNORMAL
SMUDGE CELLS # BLD: SIGNIFICANT CHANGE UP
SODIUM SERPL-SCNC: 137 MMOL/L — SIGNIFICANT CHANGE UP (ref 135–145)
SPECIMEN SOURCE: SIGNIFICANT CHANGE UP
SPECIMEN SOURCE: SIGNIFICANT CHANGE UP
WBC # BLD: 5.26 K/UL — SIGNIFICANT CHANGE UP (ref 3.8–10.5)
WBC # FLD AUTO: 5.26 K/UL — SIGNIFICANT CHANGE UP (ref 3.8–10.5)

## 2020-04-03 PROCEDURE — 99233 SBSQ HOSP IP/OBS HIGH 50: CPT | Mod: GC

## 2020-04-03 RX ADMIN — PIPERACILLIN AND TAZOBACTAM 200 GRAM(S): 4; .5 INJECTION, POWDER, LYOPHILIZED, FOR SOLUTION INTRAVENOUS at 06:11

## 2020-04-03 RX ADMIN — PIPERACILLIN AND TAZOBACTAM 200 GRAM(S): 4; .5 INJECTION, POWDER, LYOPHILIZED, FOR SOLUTION INTRAVENOUS at 12:01

## 2020-04-03 RX ADMIN — Medication 25 MICROGRAM(S): at 06:12

## 2020-04-03 RX ADMIN — Medication 40 MILLIGRAM(S): at 06:12

## 2020-04-03 RX ADMIN — Medication 200 MILLIGRAM(S): at 06:12

## 2020-04-03 RX ADMIN — AZITHROMYCIN 250 MILLIGRAM(S): 500 TABLET, FILM COATED ORAL at 12:01

## 2020-04-03 RX ADMIN — Medication 1500 MILLIGRAM(S): at 18:28

## 2020-04-03 RX ADMIN — FAMOTIDINE 20 MILLIGRAM(S): 10 INJECTION INTRAVENOUS at 12:01

## 2020-04-03 RX ADMIN — PIPERACILLIN AND TAZOBACTAM 200 GRAM(S): 4; .5 INJECTION, POWDER, LYOPHILIZED, FOR SOLUTION INTRAVENOUS at 23:23

## 2020-04-03 RX ADMIN — Medication 200 MILLIGRAM(S): at 18:28

## 2020-04-03 RX ADMIN — Medication 1500 MILLIGRAM(S): at 06:13

## 2020-04-03 RX ADMIN — Medication 40 MILLIGRAM(S): at 18:28

## 2020-04-03 RX ADMIN — PIPERACILLIN AND TAZOBACTAM 200 GRAM(S): 4; .5 INJECTION, POWDER, LYOPHILIZED, FOR SOLUTION INTRAVENOUS at 18:30

## 2020-04-03 RX ADMIN — Medication 1500 MILLIGRAM(S): at 12:02

## 2020-04-03 RX ADMIN — Medication 200 MILLIGRAM(S): at 06:13

## 2020-04-03 RX ADMIN — Medication 40 MILLIGRAM(S): at 23:23

## 2020-04-03 NOTE — PROGRESS NOTE ADULT - ATTENDING COMMENTS
Agree with the history, physical exam, assessment, and plan as outlined above with the following addendum. Briefly patient is a 70 yo F with hypothyroidism who presents with hypoxic respiratory failure 2/2 COVID19    #covid 19  On azithro, plaquenil, IV steroids. Received Toci on 4/1. Overall resp status has been stable since starting toci and steroids.   On IV zosyn for possible super imposed bacterial infection- monitor for resolution of bandemia  -Physical Therapy  -Incentive spirometer

## 2020-04-03 NOTE — PROGRESS NOTE ADULT - PROBLEM SELECTOR PLAN 3
Patient presented with Plt at 45, 123 this AM, no active signs of bleeding, likely 2/2 viral infection (no anemia, or increased bilirubin, or signs of hemolysis)   - continue to  monitor CBC   - Hold DVT ppx

## 2020-04-03 NOTE — PHYSICAL THERAPY INITIAL EVALUATION ADULT - PERTINENT HX OF CURRENT PROBLEM, REHAB EVAL
69 year old female presented to ED with fever and worsening SOB x5days. Pt states her symptoms started with myalgia and weakness 5-6 days ago. About a day later she started to have fevers and SOB. Her highest fever was 102. She has been taking tylenol for the fevers. Her SOB is at rest and worse when speaking. Pt admits to having non productive cough with deep breaths. +COVID.

## 2020-04-03 NOTE — PROGRESS NOTE ADULT - PROBLEM SELECTOR PLAN 2
Patient presented with SOB and fevers, + COVID, still febrile (T - 37.1) overnight and requiring supplemental oxygen  - Isolation precautions; contact and isolation  - COVID +   - Monitor O2 saturation, monitor for respiratory distress  - Nasal cannula as needed, avoid HFNC/BiPAP  - Tylenol 650mg for fever   - C/w ascorbic acid 1500mg IV q6h, thiamine 200mg IV BID, hydroxyxhloroquine 400mg IV BID x2 doses, then 200mg BID for 4 days, azithromycin 250mg QD  -Solumedrol started 4/1 and Tocilizamab (4/1)   - CRP and Ferritin downtrending  -monitor fever curve

## 2020-04-03 NOTE — PROGRESS NOTE ADULT - SUBJECTIVE AND OBJECTIVE BOX
OVERNIGHT EVENTS: none    SUBJECTIVE / INTERVAL HPI: Patient seen and examined at bedside. Patient is upset since no kosher milk and cereal was sent with her breakfast. Otherwise denies f/c, n/v, HA, chest pain, SOB, abdominal pain, diarrhea, dysuria    MEDICATIONS  (STANDING):  ascorbic acid 1500 milliGRAM(s) Oral four times a day  famotidine    Tablet 20 milliGRAM(s) Oral daily  hydroxychloroquine 200 milliGRAM(s) Oral every 12 hours  levothyroxine 25 MICROGram(s) Oral daily  methylPREDNISolone sodium succinate Injectable 40 milliGRAM(s) IV Push every 12 hours  piperacillin/tazobactam IVPB.. 4.5 Gram(s) IV Intermittent every 6 hours  thiamine 200 milliGRAM(s) Oral <User Schedule>    MEDICATIONS  (PRN):  acetaminophen   Tablet .. 650 milliGRAM(s) Oral every 6 hours PRN Temp greater or equal to 38C (100.4F), Mild Pain (1 - 3)  aluminum hydroxide/magnesium hydroxide/simethicone Suspension 30 milliLiter(s) Oral every 4 hours PRN Dyspepsia  benzocaine 15 mG/menthol 3.6 mG (Sugar-Free) Lozenge 1 Lozenge Oral two times a day PRN Sore Throat  benzonatate 100 milliGRAM(s) Oral every 8 hours PRN Cough  guaiFENesin   Syrup  (Sugar-Free) 100 milliGRAM(s) Oral every 6 hours PRN Cough  ondansetron    Tablet 4 milliGRAM(s) Oral every 6 hours PRN Nausea and/or Vomiting    Allergies    erythromycin (Anaphylaxis; Hives)    Intolerances        VITAL SIGNS:  Vital Signs Last 24 Hrs  T(C): 36.7 (03 Apr 2020 06:26), Max: 37.1 (02 Apr 2020 22:58)  T(F): 98.1 (03 Apr 2020 06:26), Max: 98.7 (02 Apr 2020 22:58)  HR: 64 (03 Apr 2020 06:26) (64 - 66)  BP: 123/76 (03 Apr 2020 06:26) (123/76 - 143/67)  BP(mean): --  RR: 19 (03 Apr 2020 06:26) (16 - 19)  SpO2: 86% (03 Apr 2020 06:57) (86% - 95%)      04-02-20 @ 07:01  -  04-03-20 @ 07:00  --------------------------------------------------------  IN: 200 mL / OUT: 250 mL / NET: -50 mL        PHYSICAL EXAM:  General: NAD, Laying comfortably in bed  HEENT: NC/AT, anicteric sclera, MMM  Neck: supple  Cardiovascular: +S1/S2, RRR, No murmurs, rubs, gallops  Respiratory: good air entry, + bibasilar crackles appreciated, no wheezes  Gastrointestinal: soft, NT/ND, +BSx4  Extremities: WWP, no edema, clubbing or cyanosis  Vascular: 2+ radial, DP/PT pulses B/L  Neurological: AAOx3, no focal deficits      LABS:                        12.2   5.26  )-----------( 123      ( 03 Apr 2020 08:53 )             37.0     04-03    137  |  102  |  21  ----------------------------<  112<H>  4.5   |  22  |  0.97    Ca    9.2      03 Apr 2020 08:53  Phos  3.4     04-03  Mg     2.1     04-03    TPro  6.6  /  Alb  3.6  /  TBili  0.4  /  DBili  x   /  AST  51<H>  /  ALT  72<H>  /  AlkPhos  89  04-03        CAPILLARY BLOOD GLUCOSE              RADIOLOGY & ADDITIONAL TESTS: Reviewed.

## 2020-04-03 NOTE — PROGRESS NOTE ADULT - PROBLEM SELECTOR PLAN 1
Patient presented with SOB, and with hypoxia 2/2 COVID + infection   - Currently on 4L NC, saturating 92%  - desaturated to 87-88% on RA at rest  - Wean as tolerated

## 2020-04-04 LAB
ALBUMIN SERPL ELPH-MCNC: 3.7 G/DL — SIGNIFICANT CHANGE UP (ref 3.3–5)
ALP SERPL-CCNC: 87 U/L — SIGNIFICANT CHANGE UP (ref 40–120)
ALT FLD-CCNC: 59 U/L — HIGH (ref 10–45)
ANION GAP SERPL CALC-SCNC: 14 MMOL/L — SIGNIFICANT CHANGE UP (ref 5–17)
AST SERPL-CCNC: 29 U/L — SIGNIFICANT CHANGE UP (ref 10–40)
BASOPHILS # BLD AUTO: 0.01 K/UL — SIGNIFICANT CHANGE UP (ref 0–0.2)
BASOPHILS NFR BLD AUTO: 0.2 % — SIGNIFICANT CHANGE UP (ref 0–2)
BILIRUB SERPL-MCNC: 0.4 MG/DL — SIGNIFICANT CHANGE UP (ref 0.2–1.2)
BUN SERPL-MCNC: 18 MG/DL — SIGNIFICANT CHANGE UP (ref 7–23)
CALCIUM SERPL-MCNC: 9.1 MG/DL — SIGNIFICANT CHANGE UP (ref 8.4–10.5)
CHLORIDE SERPL-SCNC: 102 MMOL/L — SIGNIFICANT CHANGE UP (ref 96–108)
CO2 SERPL-SCNC: 21 MMOL/L — LOW (ref 22–31)
CREAT SERPL-MCNC: 0.89 MG/DL — SIGNIFICANT CHANGE UP (ref 0.5–1.3)
CRP SERPL-MCNC: 2.19 MG/DL — HIGH (ref 0–0.4)
D DIMER BLD IA.RAPID-MCNC: 718 NG/ML DDU — HIGH
EOSINOPHIL # BLD AUTO: 0 K/UL — SIGNIFICANT CHANGE UP (ref 0–0.5)
EOSINOPHIL NFR BLD AUTO: 0 % — SIGNIFICANT CHANGE UP (ref 0–6)
FERRITIN SERPL-MCNC: 1900 NG/ML — HIGH (ref 15–150)
GLUCOSE SERPL-MCNC: 162 MG/DL — HIGH (ref 70–99)
HCT VFR BLD CALC: 39.2 % — SIGNIFICANT CHANGE UP (ref 34.5–45)
HGB BLD-MCNC: 13.1 G/DL — SIGNIFICANT CHANGE UP (ref 11.5–15.5)
IMM GRANULOCYTES NFR BLD AUTO: 2.3 % — HIGH (ref 0–1.5)
LYMPHOCYTES # BLD AUTO: 0.4 K/UL — LOW (ref 1–3.3)
LYMPHOCYTES # BLD AUTO: 6.5 % — LOW (ref 13–44)
MAGNESIUM SERPL-MCNC: 1.9 MG/DL — SIGNIFICANT CHANGE UP (ref 1.6–2.6)
MCHC RBC-ENTMCNC: 31.3 PG — SIGNIFICANT CHANGE UP (ref 27–34)
MCHC RBC-ENTMCNC: 33.4 GM/DL — SIGNIFICANT CHANGE UP (ref 32–36)
MCV RBC AUTO: 93.8 FL — SIGNIFICANT CHANGE UP (ref 80–100)
MONOCYTES # BLD AUTO: 0.12 K/UL — SIGNIFICANT CHANGE UP (ref 0–0.9)
MONOCYTES NFR BLD AUTO: 2 % — SIGNIFICANT CHANGE UP (ref 2–14)
NEUTROPHILS # BLD AUTO: 5.48 K/UL — SIGNIFICANT CHANGE UP (ref 1.8–7.4)
NEUTROPHILS NFR BLD AUTO: 89 % — HIGH (ref 43–77)
NRBC # BLD: 0 /100 WBCS — SIGNIFICANT CHANGE UP (ref 0–0)
PHOSPHATE SERPL-MCNC: 4.1 MG/DL — SIGNIFICANT CHANGE UP (ref 2.5–4.5)
PLATELET # BLD AUTO: 124 K/UL — LOW (ref 150–400)
POTASSIUM SERPL-MCNC: 4.6 MMOL/L — SIGNIFICANT CHANGE UP (ref 3.5–5.3)
POTASSIUM SERPL-SCNC: 4.6 MMOL/L — SIGNIFICANT CHANGE UP (ref 3.5–5.3)
PROT SERPL-MCNC: 6.9 G/DL — SIGNIFICANT CHANGE UP (ref 6–8.3)
RBC # BLD: 4.18 M/UL — SIGNIFICANT CHANGE UP (ref 3.8–5.2)
RBC # FLD: 13.4 % — SIGNIFICANT CHANGE UP (ref 10.3–14.5)
SODIUM SERPL-SCNC: 137 MMOL/L — SIGNIFICANT CHANGE UP (ref 135–145)
WBC # BLD: 6.15 K/UL — SIGNIFICANT CHANGE UP (ref 3.8–10.5)
WBC # FLD AUTO: 6.15 K/UL — SIGNIFICANT CHANGE UP (ref 3.8–10.5)

## 2020-04-04 PROCEDURE — 99233 SBSQ HOSP IP/OBS HIGH 50: CPT | Mod: GC

## 2020-04-04 RX ORDER — MAGNESIUM OXIDE 400 MG ORAL TABLET 241.3 MG
800 TABLET ORAL ONCE
Refills: 0 | Status: COMPLETED | OUTPATIENT
Start: 2020-04-04 | End: 2020-04-04

## 2020-04-04 RX ORDER — LACTOBACILLUS ACIDOPHILUS 100MM CELL
1 CAPSULE ORAL DAILY
Refills: 0 | Status: DISCONTINUED | OUTPATIENT
Start: 2020-04-04 | End: 2020-04-04

## 2020-04-04 RX ADMIN — PIPERACILLIN AND TAZOBACTAM 200 GRAM(S): 4; .5 INJECTION, POWDER, LYOPHILIZED, FOR SOLUTION INTRAVENOUS at 05:06

## 2020-04-04 RX ADMIN — PIPERACILLIN AND TAZOBACTAM 200 GRAM(S): 4; .5 INJECTION, POWDER, LYOPHILIZED, FOR SOLUTION INTRAVENOUS at 18:00

## 2020-04-04 RX ADMIN — MAGNESIUM OXIDE 400 MG ORAL TABLET 800 MILLIGRAM(S): 241.3 TABLET ORAL at 17:38

## 2020-04-04 RX ADMIN — Medication 40 MILLIGRAM(S): at 05:06

## 2020-04-04 RX ADMIN — PIPERACILLIN AND TAZOBACTAM 200 GRAM(S): 4; .5 INJECTION, POWDER, LYOPHILIZED, FOR SOLUTION INTRAVENOUS at 11:26

## 2020-04-04 RX ADMIN — Medication 25 MICROGRAM(S): at 05:06

## 2020-04-04 RX ADMIN — PIPERACILLIN AND TAZOBACTAM 200 GRAM(S): 4; .5 INJECTION, POWDER, LYOPHILIZED, FOR SOLUTION INTRAVENOUS at 23:22

## 2020-04-04 RX ADMIN — FAMOTIDINE 20 MILLIGRAM(S): 10 INJECTION INTRAVENOUS at 11:26

## 2020-04-04 RX ADMIN — Medication 40 MILLIGRAM(S): at 18:00

## 2020-04-04 NOTE — PROGRESS NOTE ADULT - PROBLEM SELECTOR PLAN 1
Patient presented with SOB, and with hypoxia 2/2 COVID + infection   - Currently on 5L NC, saturating 92%  - desaturated to 87-88% on RA at rest  - Wean as tolerated

## 2020-04-04 NOTE — PROGRESS NOTE ADULT - SUBJECTIVE AND OBJECTIVE BOX
CC: Patient is a 69y old  Female who presents with a chief complaint of Corona Virus (03 Apr 2020 16:33)      OVERNIGHT EVENTS:    SUBJECTIVE / INTERVAL HPI: Patient seen and examined at bedside.     ROS: negative unless otherwise stated above.    VITAL SIGNS:  Vital Signs Last 24 Hrs  T(C): 36.4 (04 Apr 2020 04:55), Max: 36.6 (04 Apr 2020 00:51)  T(F): 97.6 (04 Apr 2020 04:55), Max: 97.9 (04 Apr 2020 00:51)  HR: 58 (04 Apr 2020 04:55) (58 - 63)  BP: 129/81 (04 Apr 2020 04:55) (129/81 - 154/77)  BP(mean): --  RR: 16 (04 Apr 2020 04:55) (16 - 18)  SpO2: 92% (04 Apr 2020 04:55) (92% - 93%)    PHYSICAL EXAM:    General: WDWN  HEENT: NC/AT; PERRL, anicteric sclera; MMM  Neck: supple  Cardiovascular: +S1/S2; RRR  Respiratory:   Gastrointestinal: soft, NT/ND; +BSx4  Extremities: WWP; no edema, clubbing or cyanosis  Vascular: 2+ radial, DP/PT pulses B/L  Neurological: AAOx3; no focal deficits    MEDICATIONS:  MEDICATIONS  (STANDING):  famotidine    Tablet 20 milliGRAM(s) Oral daily  levothyroxine 25 MICROGram(s) Oral daily  magnesium oxide 800 milliGRAM(s) Oral once  methylPREDNISolone sodium succinate Injectable 40 milliGRAM(s) IV Push two times a day  piperacillin/tazobactam IVPB.. 4.5 Gram(s) IV Intermittent every 6 hours    MEDICATIONS  (PRN):  acetaminophen   Tablet .. 650 milliGRAM(s) Oral every 6 hours PRN Temp greater or equal to 38C (100.4F), Mild Pain (1 - 3)  aluminum hydroxide/magnesium hydroxide/simethicone Suspension 30 milliLiter(s) Oral every 4 hours PRN Dyspepsia  benzocaine 15 mG/menthol 3.6 mG (Sugar-Free) Lozenge 1 Lozenge Oral two times a day PRN Sore Throat  benzonatate 100 milliGRAM(s) Oral every 8 hours PRN Cough  guaiFENesin   Syrup  (Sugar-Free) 100 milliGRAM(s) Oral every 6 hours PRN Cough  ondansetron    Tablet 4 milliGRAM(s) Oral every 6 hours PRN Nausea and/or Vomiting      ALLERGIES:  Allergies    erythromycin (Anaphylaxis; Hives)    Intolerances        LABS:                        13.1   6.15  )-----------( 124      ( 04 Apr 2020 07:18 )             39.2     04-04    137  |  102  |  18  ----------------------------<  162<H>  4.6   |  21<L>  |  0.89    Ca    9.1      04 Apr 2020 07:18  Phos  4.1     04-04  Mg     1.9     04-04    TPro  6.9  /  Alb  3.7  /  TBili  0.4  /  DBili  x   /  AST  29  /  ALT  59<H>  /  AlkPhos  87  04-04        CAPILLARY BLOOD GLUCOSE          RADIOLOGY & ADDITIONAL TESTS: Reviewed. CC: Patient is a 69y old  Female who presents with a chief complaint of Corona Virus (03 Apr 2020 16:33)      OVERNIGHT EVENTS: No acute overnight events.     SUBJECTIVE / INTERVAL HPI: Patient seen and examined at bedside. Witnessed patient ambulate on own, ambulating well.  Patient admits to an episode of Diarrhea in the AM. Patient denies n/v, constipation, abdominal pain, dysuria.     ROS: negative unless otherwise stated above.    VITAL SIGNS:  Vital Signs Last 24 Hrs  T(C): 36.4 (04 Apr 2020 04:55), Max: 36.6 (04 Apr 2020 00:51)  T(F): 97.6 (04 Apr 2020 04:55), Max: 97.9 (04 Apr 2020 00:51)  HR: 58 (04 Apr 2020 04:55) (58 - 63)  BP: 129/81 (04 Apr 2020 04:55) (129/81 - 154/77)  BP(mean): --  RR: 16 (04 Apr 2020 04:55) (16 - 18)  SpO2: 92% (04 Apr 2020 04:55) (92% - 93%)    PHYSICAL EXAM:    General: WDWN  HEENT: NC/AT; PERRL, anicteric sclera; MMM  Neck: supple  Cardiovascular: +S1/S2; RRR  Respiratory:  B/L crackles noted. Good inspiratory lung sounds. No noted wheezing, ronchi.   Gastrointestinal: soft, NT/ND; +BSx4  Extremities: WWP; no edema, clubbing or cyanosis  Vascular: 2+ radial, DP/PT pulses B/L  Neurological: AAOx3; no focal deficits    MEDICATIONS:  MEDICATIONS  (STANDING):  famotidine    Tablet 20 milliGRAM(s) Oral daily  levothyroxine 25 MICROGram(s) Oral daily  magnesium oxide 800 milliGRAM(s) Oral once  methylPREDNISolone sodium succinate Injectable 40 milliGRAM(s) IV Push two times a day  piperacillin/tazobactam IVPB.. 4.5 Gram(s) IV Intermittent every 6 hours    MEDICATIONS  (PRN):  acetaminophen   Tablet .. 650 milliGRAM(s) Oral every 6 hours PRN Temp greater or equal to 38C (100.4F), Mild Pain (1 - 3)  aluminum hydroxide/magnesium hydroxide/simethicone Suspension 30 milliLiter(s) Oral every 4 hours PRN Dyspepsia  benzocaine 15 mG/menthol 3.6 mG (Sugar-Free) Lozenge 1 Lozenge Oral two times a day PRN Sore Throat  benzonatate 100 milliGRAM(s) Oral every 8 hours PRN Cough  guaiFENesin   Syrup  (Sugar-Free) 100 milliGRAM(s) Oral every 6 hours PRN Cough  ondansetron    Tablet 4 milliGRAM(s) Oral every 6 hours PRN Nausea and/or Vomiting      ALLERGIES:  Allergies    erythromycin (Anaphylaxis; Hives)    Intolerances        LABS:                        13.1   6.15  )-----------( 124      ( 04 Apr 2020 07:18 )             39.2     04-04    137  |  102  |  18  ----------------------------<  162<H>  4.6   |  21<L>  |  0.89    Ca    9.1      04 Apr 2020 07:18  Phos  4.1     04-04  Mg     1.9     04-04    TPro  6.9  /  Alb  3.7  /  TBili  0.4  /  DBili  x   /  AST  29  /  ALT  59<H>  /  AlkPhos  87  04-04        CAPILLARY BLOOD GLUCOSE          RADIOLOGY & ADDITIONAL TESTS: Reviewed.

## 2020-04-04 NOTE — PROGRESS NOTE ADULT - ATTENDING COMMENTS
Agree with the history, physical exam, assessment, and plan as outlined above with the following addendum. Briefly patient is a 68 yo F with hypothyroidism who presents with hypoxic respiratory failure 2/2 COVID19    #covid 19  On azithro, plaquenil, IV steroids. Received Toci on 4/1. Overall resp status has been stable since starting toci and steroids. Requiring 4 to 6 LNC  On Zosyn for possible super imposed bacterial infection given bandemia on 4/2, which has since resolved since starting IV abc. MRSA pcr neg, blood cultures neg  D-dimer, CRP downtrending   Case discussed with Dr. Abreu

## 2020-04-04 NOTE — PROGRESS NOTE ADULT - ASSESSMENT
Pt is a 68 y/o F with PMHx hypothyroidism, splenic artery aneurysm (s/p surgery), appendectomy, now admitted with COVID+.

## 2020-04-05 DIAGNOSIS — J18.9 PNEUMONIA, UNSPECIFIED ORGANISM: ICD-10-CM

## 2020-04-05 LAB
ALBUMIN SERPL ELPH-MCNC: 3.9 G/DL — SIGNIFICANT CHANGE UP (ref 3.3–5)
ALP SERPL-CCNC: 75 U/L — SIGNIFICANT CHANGE UP (ref 40–120)
ALT FLD-CCNC: 44 U/L — SIGNIFICANT CHANGE UP (ref 10–45)
ANION GAP SERPL CALC-SCNC: 14 MMOL/L — SIGNIFICANT CHANGE UP (ref 5–17)
AST SERPL-CCNC: 18 U/L — SIGNIFICANT CHANGE UP (ref 10–40)
BASOPHILS # BLD AUTO: 0.03 K/UL — SIGNIFICANT CHANGE UP (ref 0–0.2)
BASOPHILS NFR BLD AUTO: 0.5 % — SIGNIFICANT CHANGE UP (ref 0–2)
BILIRUB SERPL-MCNC: 0.4 MG/DL — SIGNIFICANT CHANGE UP (ref 0.2–1.2)
BUN SERPL-MCNC: 19 MG/DL — SIGNIFICANT CHANGE UP (ref 7–23)
CALCIUM SERPL-MCNC: 9.2 MG/DL — SIGNIFICANT CHANGE UP (ref 8.4–10.5)
CHLORIDE SERPL-SCNC: 104 MMOL/L — SIGNIFICANT CHANGE UP (ref 96–108)
CO2 SERPL-SCNC: 21 MMOL/L — LOW (ref 22–31)
CREAT SERPL-MCNC: 0.85 MG/DL — SIGNIFICANT CHANGE UP (ref 0.5–1.3)
CRP SERPL-MCNC: 1.1 MG/DL — HIGH (ref 0–0.4)
D DIMER BLD IA.RAPID-MCNC: 648 NG/ML DDU — HIGH
EOSINOPHIL # BLD AUTO: 0 K/UL — SIGNIFICANT CHANGE UP (ref 0–0.5)
EOSINOPHIL NFR BLD AUTO: 0 % — SIGNIFICANT CHANGE UP (ref 0–6)
FERRITIN SERPL-MCNC: 1374 NG/ML — HIGH (ref 15–150)
GLUCOSE SERPL-MCNC: 131 MG/DL — HIGH (ref 70–99)
HCT VFR BLD CALC: 39.5 % — SIGNIFICANT CHANGE UP (ref 34.5–45)
HGB BLD-MCNC: 13.1 G/DL — SIGNIFICANT CHANGE UP (ref 11.5–15.5)
IMM GRANULOCYTES NFR BLD AUTO: 3.8 % — HIGH (ref 0–1.5)
LYMPHOCYTES # BLD AUTO: 0.55 K/UL — LOW (ref 1–3.3)
LYMPHOCYTES # BLD AUTO: 10 % — LOW (ref 13–44)
MAGNESIUM SERPL-MCNC: 2.2 MG/DL — SIGNIFICANT CHANGE UP (ref 1.6–2.6)
MCHC RBC-ENTMCNC: 31 PG — SIGNIFICANT CHANGE UP (ref 27–34)
MCHC RBC-ENTMCNC: 33.2 GM/DL — SIGNIFICANT CHANGE UP (ref 32–36)
MCV RBC AUTO: 93.4 FL — SIGNIFICANT CHANGE UP (ref 80–100)
MONOCYTES # BLD AUTO: 0.42 K/UL — SIGNIFICANT CHANGE UP (ref 0–0.9)
MONOCYTES NFR BLD AUTO: 7.6 % — SIGNIFICANT CHANGE UP (ref 2–14)
NEUTROPHILS # BLD AUTO: 4.29 K/UL — SIGNIFICANT CHANGE UP (ref 1.8–7.4)
NEUTROPHILS NFR BLD AUTO: 78.1 % — HIGH (ref 43–77)
NRBC # BLD: 0 /100 WBCS — SIGNIFICANT CHANGE UP (ref 0–0)
PHOSPHATE SERPL-MCNC: 2.8 MG/DL — SIGNIFICANT CHANGE UP (ref 2.5–4.5)
PLATELET # BLD AUTO: 153 K/UL — SIGNIFICANT CHANGE UP (ref 150–400)
POTASSIUM SERPL-MCNC: 4.7 MMOL/L — SIGNIFICANT CHANGE UP (ref 3.5–5.3)
POTASSIUM SERPL-SCNC: 4.7 MMOL/L — SIGNIFICANT CHANGE UP (ref 3.5–5.3)
PROT SERPL-MCNC: 6.8 G/DL — SIGNIFICANT CHANGE UP (ref 6–8.3)
RBC # BLD: 4.23 M/UL — SIGNIFICANT CHANGE UP (ref 3.8–5.2)
RBC # FLD: 13.5 % — SIGNIFICANT CHANGE UP (ref 10.3–14.5)
SODIUM SERPL-SCNC: 139 MMOL/L — SIGNIFICANT CHANGE UP (ref 135–145)
WBC # BLD: 5.5 K/UL — SIGNIFICANT CHANGE UP (ref 3.8–10.5)
WBC # FLD AUTO: 5.5 K/UL — SIGNIFICANT CHANGE UP (ref 3.8–10.5)

## 2020-04-05 PROCEDURE — 99233 SBSQ HOSP IP/OBS HIGH 50: CPT | Mod: GC

## 2020-04-05 RX ADMIN — PIPERACILLIN AND TAZOBACTAM 200 GRAM(S): 4; .5 INJECTION, POWDER, LYOPHILIZED, FOR SOLUTION INTRAVENOUS at 11:53

## 2020-04-05 RX ADMIN — Medication 40 MILLIGRAM(S): at 05:33

## 2020-04-05 RX ADMIN — PIPERACILLIN AND TAZOBACTAM 200 GRAM(S): 4; .5 INJECTION, POWDER, LYOPHILIZED, FOR SOLUTION INTRAVENOUS at 05:33

## 2020-04-05 RX ADMIN — Medication 25 MICROGRAM(S): at 05:33

## 2020-04-05 RX ADMIN — PIPERACILLIN AND TAZOBACTAM 200 GRAM(S): 4; .5 INJECTION, POWDER, LYOPHILIZED, FOR SOLUTION INTRAVENOUS at 17:43

## 2020-04-05 RX ADMIN — FAMOTIDINE 20 MILLIGRAM(S): 10 INJECTION INTRAVENOUS at 11:53

## 2020-04-05 RX ADMIN — Medication 40 MILLIGRAM(S): at 17:43

## 2020-04-05 NOTE — PROGRESS NOTE ADULT - PROBLEM SELECTOR PLAN 1
Patient presented with SOB, and with hypoxia 2/2 COVID + infection   - Currently on 3L NC, saturating 96%  - desaturated to 91% ambulatory on Room Air   - Wean as tolerated

## 2020-04-05 NOTE — PROGRESS NOTE ADULT - PROBLEM SELECTOR PLAN 4
Continue home medication synthroid  - C/w with Levothyroxine 25 mcg daily Patient presented with Plt at 45, 123 this AM, no active signs of bleeding, likely 2/2 viral infection (no anemia, or increased bilirubin, or signs of hemolysis)   - continue to  monitor CBC   - Hold DVT ppx

## 2020-04-05 NOTE — PROGRESS NOTE ADULT - PROBLEM SELECTOR PLAN 5
F: No fluids   E: Replete for K<4.0 and Mg<2.0   N: Regular Diet   DVT: None (Thrombocytopenia)   GI: Famotidine Continue home medication synthroid  - C/w with Levothyroxine 25 mcg daily

## 2020-04-05 NOTE — PROGRESS NOTE ADULT - SUBJECTIVE AND OBJECTIVE BOX
CC: Patient is a 69y old  Female who presents with a chief complaint of Corona Virus (04 Apr 2020 10:33)      OVERNIGHT EVENTS:    SUBJECTIVE / INTERVAL HPI: Patient seen and examined at bedside.     ROS: negative unless otherwise stated above.    VITAL SIGNS:  Vital Signs Last 24 Hrs  T(C): 36.9 (05 Apr 2020 12:02), Max: 36.9 (05 Apr 2020 12:02)  T(F): 98.5 (05 Apr 2020 12:02), Max: 98.5 (05 Apr 2020 12:02)  HR: 62 (05 Apr 2020 12:02) (62 - 70)  BP: 155/76 (05 Apr 2020 12:02) (142/83 - 157/79)  BP(mean): --  RR: 16 (05 Apr 2020 12:02) (16 - 18)  SpO2: 95% (05 Apr 2020 12:02) (93% - 95%)    PHYSICAL EXAM:    General: WDWN  HEENT: NC/AT; PERRL, anicteric sclera; MMM  Neck: supple  Cardiovascular: +S1/S2; RRR  Respiratory: CTA B/L; no W/R/R  Gastrointestinal: soft, NT/ND; +BSx4  Extremities: WWP; no edema, clubbing or cyanosis  Vascular: 2+ radial, DP/PT pulses B/L  Neurological: AAOx3; no focal deficits    MEDICATIONS:  MEDICATIONS  (STANDING):  famotidine    Tablet 20 milliGRAM(s) Oral daily  levothyroxine 25 MICROGram(s) Oral daily  methylPREDNISolone sodium succinate Injectable 40 milliGRAM(s) IV Push two times a day  piperacillin/tazobactam IVPB.. 4.5 Gram(s) IV Intermittent every 6 hours    MEDICATIONS  (PRN):  acetaminophen   Tablet .. 650 milliGRAM(s) Oral every 6 hours PRN Temp greater or equal to 38C (100.4F), Mild Pain (1 - 3)  aluminum hydroxide/magnesium hydroxide/simethicone Suspension 30 milliLiter(s) Oral every 4 hours PRN Dyspepsia  benzocaine 15 mG/menthol 3.6 mG (Sugar-Free) Lozenge 1 Lozenge Oral two times a day PRN Sore Throat  benzonatate 100 milliGRAM(s) Oral every 8 hours PRN Cough  guaiFENesin   Syrup  (Sugar-Free) 100 milliGRAM(s) Oral every 6 hours PRN Cough  ondansetron    Tablet 4 milliGRAM(s) Oral every 6 hours PRN Nausea and/or Vomiting      ALLERGIES:  Allergies    erythromycin (Anaphylaxis; Hives)    Intolerances        LABS:                        13.1   5.50  )-----------( 153      ( 05 Apr 2020 09:56 )             39.5     04-05    139  |  104  |  19  ----------------------------<  131<H>  4.7   |  21<L>  |  0.85    Ca    9.2      05 Apr 2020 09:56  Phos  2.8     04-05  Mg     2.2     04-05    TPro  6.8  /  Alb  3.9  /  TBili  0.4  /  DBili  x   /  AST  18  /  ALT  44  /  AlkPhos  75  04-05        CAPILLARY BLOOD GLUCOSE          RADIOLOGY & ADDITIONAL TESTS: Reviewed. CC: Patient is a 69y old  Female who presents with a chief complaint of Corona Virus (04 Apr 2020 10:33)      OVERNIGHT EVENTS: No acute overnight events    SUBJECTIVE / INTERVAL HPI: Patient seen and examined at bedside. Able to ambulate off O2 sating 91%.  Patient is comfortable, denies n/v, chest pain, constipation.  She does endorse diarrhea usually after receiving her Zosyn dose.     ROS: negative unless otherwise stated above.    VITAL SIGNS:  Vital Signs Last 24 Hrs  T(C): 36.9 (05 Apr 2020 12:02), Max: 36.9 (05 Apr 2020 12:02)  T(F): 98.5 (05 Apr 2020 12:02), Max: 98.5 (05 Apr 2020 12:02)  HR: 62 (05 Apr 2020 12:02) (62 - 70)  BP: 155/76 (05 Apr 2020 12:02) (142/83 - 157/79)  BP(mean): --  RR: 16 (05 Apr 2020 12:02) (16 - 18)  SpO2: 95% (05 Apr 2020 12:02) (93% - 95%)    PHYSICAL EXAM:    General: WDWN  HEENT: NC/AT; PERRL, anicteric sclera; MMM  Neck: supple  Cardiovascular: +S1/S2; RRR  Respiratory: Some crackles noted to right lower lobe. No wheezing, rhonchi noted. Good inspiratory effort.   Gastrointestinal: soft, NT/ND; +BSx4  Extremities: WWP; no edema, clubbing or cyanosis  Vascular: 2+ radial, DP/PT pulses B/L  Neurological: AAOx3; no focal deficits    MEDICATIONS:  MEDICATIONS  (STANDING):  famotidine    Tablet 20 milliGRAM(s) Oral daily  levothyroxine 25 MICROGram(s) Oral daily  methylPREDNISolone sodium succinate Injectable 40 milliGRAM(s) IV Push two times a day  piperacillin/tazobactam IVPB.. 4.5 Gram(s) IV Intermittent every 6 hours    MEDICATIONS  (PRN):  acetaminophen   Tablet .. 650 milliGRAM(s) Oral every 6 hours PRN Temp greater or equal to 38C (100.4F), Mild Pain (1 - 3)  aluminum hydroxide/magnesium hydroxide/simethicone Suspension 30 milliLiter(s) Oral every 4 hours PRN Dyspepsia  benzocaine 15 mG/menthol 3.6 mG (Sugar-Free) Lozenge 1 Lozenge Oral two times a day PRN Sore Throat  benzonatate 100 milliGRAM(s) Oral every 8 hours PRN Cough  guaiFENesin   Syrup  (Sugar-Free) 100 milliGRAM(s) Oral every 6 hours PRN Cough  ondansetron    Tablet 4 milliGRAM(s) Oral every 6 hours PRN Nausea and/or Vomiting      ALLERGIES:  Allergies    erythromycin (Anaphylaxis; Hives)    Intolerances        LABS:                        13.1   5.50  )-----------( 153      ( 05 Apr 2020 09:56 )             39.5     04-05    139  |  104  |  19  ----------------------------<  131<H>  4.7   |  21<L>  |  0.85    Ca    9.2      05 Apr 2020 09:56  Phos  2.8     04-05  Mg     2.2     04-05    TPro  6.8  /  Alb  3.9  /  TBili  0.4  /  DBili  x   /  AST  18  /  ALT  44  /  AlkPhos  75  04-05        CAPILLARY BLOOD GLUCOSE          RADIOLOGY & ADDITIONAL TESTS: Reviewed. CC: Patient is a 69y old  Female who presents with a chief complaint of Corona Virus (04 Apr 2020 10:33)      OVERNIGHT EVENTS: No acute overnight events.     SUBJECTIVE / INTERVAL HPI: Patient seen and examined at bedside. Able to ambulate off O2 sating 91%.  Patient is comfortable, denies n/v, chest pain, constipation.  She does endorse diarrhea usually after receiving her Zosyn dose.     ROS: negative unless otherwise stated above.    VITAL SIGNS:  Vital Signs Last 24 Hrs  T(C): 36.9 (05 Apr 2020 12:02), Max: 36.9 (05 Apr 2020 12:02)  T(F): 98.5 (05 Apr 2020 12:02), Max: 98.5 (05 Apr 2020 12:02)  HR: 62 (05 Apr 2020 12:02) (62 - 70)  BP: 155/76 (05 Apr 2020 12:02) (142/83 - 157/79)  BP(mean): --  RR: 16 (05 Apr 2020 12:02) (16 - 18)  SpO2: 95% (05 Apr 2020 12:02) (93% - 95%)    PHYSICAL EXAM:    General: WDWN  HEENT: NC/AT; PERRL, anicteric sclera; MMM  Neck: supple  Cardiovascular: +S1/S2; RRR  Respiratory: Some crackles noted to right lower lobe. No wheezing, rhonchi noted. Good inspiratory effort.   Gastrointestinal: soft, NT/ND; +BSx4  Extremities: WWP; no edema, clubbing or cyanosis  Vascular: 2+ radial, DP/PT pulses B/L  Neurological: AAOx3; no focal deficits    MEDICATIONS:  MEDICATIONS  (STANDING):  famotidine    Tablet 20 milliGRAM(s) Oral daily  levothyroxine 25 MICROGram(s) Oral daily  methylPREDNISolone sodium succinate Injectable 40 milliGRAM(s) IV Push two times a day  piperacillin/tazobactam IVPB.. 4.5 Gram(s) IV Intermittent every 6 hours    MEDICATIONS  (PRN):  acetaminophen   Tablet .. 650 milliGRAM(s) Oral every 6 hours PRN Temp greater or equal to 38C (100.4F), Mild Pain (1 - 3)  aluminum hydroxide/magnesium hydroxide/simethicone Suspension 30 milliLiter(s) Oral every 4 hours PRN Dyspepsia  benzocaine 15 mG/menthol 3.6 mG (Sugar-Free) Lozenge 1 Lozenge Oral two times a day PRN Sore Throat  benzonatate 100 milliGRAM(s) Oral every 8 hours PRN Cough  guaiFENesin   Syrup  (Sugar-Free) 100 milliGRAM(s) Oral every 6 hours PRN Cough  ondansetron    Tablet 4 milliGRAM(s) Oral every 6 hours PRN Nausea and/or Vomiting      ALLERGIES:  Allergies    erythromycin (Anaphylaxis; Hives)    Intolerances        LABS:                        13.1   5.50  )-----------( 153      ( 05 Apr 2020 09:56 )             39.5     04-05    139  |  104  |  19  ----------------------------<  131<H>  4.7   |  21<L>  |  0.85    Ca    9.2      05 Apr 2020 09:56  Phos  2.8     04-05  Mg     2.2     04-05    TPro  6.8  /  Alb  3.9  /  TBili  0.4  /  DBili  x   /  AST  18  /  ALT  44  /  AlkPhos  75  04-05        CAPILLARY BLOOD GLUCOSE          RADIOLOGY & ADDITIONAL TESTS: Reviewed.

## 2020-04-05 NOTE — PROGRESS NOTE ADULT - PROBLEM SELECTOR PLAN 3
Patient presented with Plt at 45, 123 this AM, no active signs of bleeding, likely 2/2 viral infection (no anemia, or increased bilirubin, or signs of hemolysis)   - continue to  monitor CBC   - Hold DVT ppx Concomitantly treating for HAP. MRSA swab negative  -transition from IV Zosyn to PO Levaquin 750mg Daily in anticipation for possible d/c tomorrow

## 2020-04-05 NOTE — PROGRESS NOTE ADULT - ATTENDING COMMENTS
Agree with the history, physical exam, assessment, and plan as outlined above with the following addendum. Briefly patient is a 70 yo F with hypothyroidism who presents with hypoxic respiratory failure 2/2 COVID19    #covid 19- with hypoxia, requiring oxygen.   On azithro, plaquenil, IV steroids. Received Toci on 4/1. Overall resp status has been improving since starting toci and steroids. Oxygen overall weaning from 6L to 3L. Abmulated off OS with oxygenation of 91%.  On Zosyn for possible super imposed bacterial infection given bandemia on 4/2, which has since resolved since starting IV abc. MRSA pcr neg, blood cultures neg  D-dimer, CRP downtrending   Case discussed with Dr. Abreu

## 2020-04-06 LAB
ALBUMIN SERPL ELPH-MCNC: 3.8 G/DL — SIGNIFICANT CHANGE UP (ref 3.3–5)
ALP SERPL-CCNC: 74 U/L — SIGNIFICANT CHANGE UP (ref 40–120)
ALT FLD-CCNC: 39 U/L — SIGNIFICANT CHANGE UP (ref 10–45)
ANION GAP SERPL CALC-SCNC: 14 MMOL/L — SIGNIFICANT CHANGE UP (ref 5–17)
AST SERPL-CCNC: 15 U/L — SIGNIFICANT CHANGE UP (ref 10–40)
BASOPHILS # BLD AUTO: 0.03 K/UL — SIGNIFICANT CHANGE UP (ref 0–0.2)
BASOPHILS NFR BLD AUTO: 0.5 % — SIGNIFICANT CHANGE UP (ref 0–2)
BILIRUB SERPL-MCNC: 0.4 MG/DL — SIGNIFICANT CHANGE UP (ref 0.2–1.2)
BUN SERPL-MCNC: 20 MG/DL — SIGNIFICANT CHANGE UP (ref 7–23)
CALCIUM SERPL-MCNC: 9.1 MG/DL — SIGNIFICANT CHANGE UP (ref 8.4–10.5)
CHLORIDE SERPL-SCNC: 103 MMOL/L — SIGNIFICANT CHANGE UP (ref 96–108)
CO2 SERPL-SCNC: 23 MMOL/L — SIGNIFICANT CHANGE UP (ref 22–31)
CREAT SERPL-MCNC: 0.76 MG/DL — SIGNIFICANT CHANGE UP (ref 0.5–1.3)
CRP SERPL-MCNC: 0.77 MG/DL — HIGH (ref 0–0.4)
CULTURE RESULTS: NO GROWTH — SIGNIFICANT CHANGE UP
CULTURE RESULTS: NO GROWTH — SIGNIFICANT CHANGE UP
D DIMER BLD IA.RAPID-MCNC: 628 NG/ML DDU — HIGH
EOSINOPHIL # BLD AUTO: 0 K/UL — SIGNIFICANT CHANGE UP (ref 0–0.5)
EOSINOPHIL NFR BLD AUTO: 0 % — SIGNIFICANT CHANGE UP (ref 0–6)
FERRITIN SERPL-MCNC: 1203 NG/ML — HIGH (ref 15–150)
GLUCOSE SERPL-MCNC: 139 MG/DL — HIGH (ref 70–99)
HCT VFR BLD CALC: 39.2 % — SIGNIFICANT CHANGE UP (ref 34.5–45)
HGB BLD-MCNC: 13.1 G/DL — SIGNIFICANT CHANGE UP (ref 11.5–15.5)
IMM GRANULOCYTES NFR BLD AUTO: 4.6 % — HIGH (ref 0–1.5)
LYMPHOCYTES # BLD AUTO: 0.62 K/UL — LOW (ref 1–3.3)
LYMPHOCYTES # BLD AUTO: 10.6 % — LOW (ref 13–44)
MAGNESIUM SERPL-MCNC: 2.2 MG/DL — SIGNIFICANT CHANGE UP (ref 1.6–2.6)
MCHC RBC-ENTMCNC: 30.9 PG — SIGNIFICANT CHANGE UP (ref 27–34)
MCHC RBC-ENTMCNC: 33.4 GM/DL — SIGNIFICANT CHANGE UP (ref 32–36)
MCV RBC AUTO: 92.5 FL — SIGNIFICANT CHANGE UP (ref 80–100)
MONOCYTES # BLD AUTO: 0.41 K/UL — SIGNIFICANT CHANGE UP (ref 0–0.9)
MONOCYTES NFR BLD AUTO: 7 % — SIGNIFICANT CHANGE UP (ref 2–14)
NEUTROPHILS # BLD AUTO: 4.5 K/UL — SIGNIFICANT CHANGE UP (ref 1.8–7.4)
NEUTROPHILS NFR BLD AUTO: 77.3 % — HIGH (ref 43–77)
NRBC # BLD: 0 /100 WBCS — SIGNIFICANT CHANGE UP (ref 0–0)
PHOSPHATE SERPL-MCNC: 2.8 MG/DL — SIGNIFICANT CHANGE UP (ref 2.5–4.5)
PLATELET # BLD AUTO: 170 K/UL — SIGNIFICANT CHANGE UP (ref 150–400)
POTASSIUM SERPL-MCNC: 4.6 MMOL/L — SIGNIFICANT CHANGE UP (ref 3.5–5.3)
POTASSIUM SERPL-SCNC: 4.6 MMOL/L — SIGNIFICANT CHANGE UP (ref 3.5–5.3)
PROT SERPL-MCNC: 6.8 G/DL — SIGNIFICANT CHANGE UP (ref 6–8.3)
RBC # BLD: 4.24 M/UL — SIGNIFICANT CHANGE UP (ref 3.8–5.2)
RBC # FLD: 13.2 % — SIGNIFICANT CHANGE UP (ref 10.3–14.5)
SODIUM SERPL-SCNC: 140 MMOL/L — SIGNIFICANT CHANGE UP (ref 135–145)
SPECIMEN SOURCE: SIGNIFICANT CHANGE UP
SPECIMEN SOURCE: SIGNIFICANT CHANGE UP
WBC # BLD: 5.83 K/UL — SIGNIFICANT CHANGE UP (ref 3.8–10.5)
WBC # FLD AUTO: 5.83 K/UL — SIGNIFICANT CHANGE UP (ref 3.8–10.5)

## 2020-04-06 RX ORDER — LOPERAMIDE HCL 2 MG
2 TABLET ORAL ONCE
Refills: 0 | Status: COMPLETED | OUTPATIENT
Start: 2020-04-06 | End: 2020-04-06

## 2020-04-06 RX ORDER — ENOXAPARIN SODIUM 100 MG/ML
40 INJECTION SUBCUTANEOUS EVERY 24 HOURS
Refills: 0 | Status: DISCONTINUED | OUTPATIENT
Start: 2020-04-06 | End: 2020-04-07

## 2020-04-06 RX ADMIN — Medication 40 MILLIGRAM(S): at 05:04

## 2020-04-06 RX ADMIN — ENOXAPARIN SODIUM 40 MILLIGRAM(S): 100 INJECTION SUBCUTANEOUS at 22:22

## 2020-04-06 RX ADMIN — Medication 2 MILLIGRAM(S): at 22:22

## 2020-04-06 RX ADMIN — Medication 40 MILLIGRAM(S): at 18:26

## 2020-04-06 RX ADMIN — FAMOTIDINE 20 MILLIGRAM(S): 10 INJECTION INTRAVENOUS at 13:24

## 2020-04-06 RX ADMIN — Medication 25 MICROGRAM(S): at 05:04

## 2020-04-06 NOTE — PROGRESS NOTE ADULT - PROBLEM SELECTOR PLAN 1
Patient presented with SOB, and with hypoxia 2/2 COVID + infection   - Currently on 2L NC, saturating 94%  - Mantaining 91% ambulatory on Room Air   - Wean as tolerated

## 2020-04-06 NOTE — PROGRESS NOTE ADULT - PROBLEM SELECTOR PROBLEM 6
Prophylactic measure
Nutrition, metabolism, and development symptoms
Prophylactic measure
Nutrition, metabolism, and development symptoms

## 2020-04-06 NOTE — PROGRESS NOTE ADULT - PROBLEM SELECTOR PROBLEM 1
Respiratory failure with hypoxia, unspecified chronicity

## 2020-04-06 NOTE — PROGRESS NOTE ADULT - PROBLEM SELECTOR PLAN 3
Concomitantly treating for HAP. MRSA swab negative  -Pt with abd. cramps/diarrhea, possible due to PO Levaquin 750mg Daily, will switch to IV while pt. in-house

## 2020-04-06 NOTE — PROGRESS NOTE ADULT - ATTENDING COMMENTS
Improving, monitor oxygen saturation on room air  C/w current treatment for pneumonia, will change levofloxacin to IV given persistent diarrhea  Anticipate dc home tomorrow  BMI of 34 --> Obesity  Rest as above

## 2020-04-06 NOTE — PROGRESS NOTE ADULT - PROBLEM SELECTOR PLAN 2
Patient presented with SOB and fevers, + COVID, afebrile  - Isolation precautions; contact and isolation  - Monitor O2 saturation, monitor for respiratory distress  - Tylenol 650mg for fever   - c/w Solumedrol started 4/1 and s/p Tocilizamab (4/1)   - CRP and Ferritin downtrending

## 2020-04-06 NOTE — PROGRESS NOTE ADULT - PROBLEM SELECTOR PLAN 6
Lovenox for DVT ppx
F: No fluids   E: Replete for K<4.0 and Mg<2.0   N: Regular Diet   DVT: None (Thrombocytopenia)   GI: Famotidine
Lovenox for DVT ppx
F: No fluids   E: Replete for K<4.0 and Mg<2.0   N: Regular Diet   DVT: None (Thrombocytopenia)   GI: Famotidine

## 2020-04-06 NOTE — PROGRESS NOTE ADULT - PROBLEM SELECTOR PLAN 4
Patient presented with Plt at 45, 170 this AM, no active signs of bleeding, likely 2/2 viral infection (no anemia, or increased bilirubin, or signs of hemolysis)   - continue to  monitor CBC   - resume DVT ppx

## 2020-04-06 NOTE — PROGRESS NOTE ADULT - SUBJECTIVE AND OBJECTIVE BOX
OVERNIGHT EVENTS:    SUBJECTIVE / INTERVAL HPI: Patient seen and examined at bedside. Pt c/o diarrhea and abdominal cramping that she attributes to abx's. Denies f/c, n/v, HA, chest pain, SOB.  MEDICATIONS  (STANDING):  famotidine    Tablet 20 milliGRAM(s) Oral daily  levoFLOXacin IVPB      levothyroxine 25 MICROGram(s) Oral daily  loperamide 2 milliGRAM(s) Oral once  methylPREDNISolone sodium succinate Injectable 40 milliGRAM(s) IV Push two times a day    MEDICATIONS  (PRN):  acetaminophen   Tablet .. 650 milliGRAM(s) Oral every 6 hours PRN Temp greater or equal to 38C (100.4F), Mild Pain (1 - 3)  aluminum hydroxide/magnesium hydroxide/simethicone Suspension 30 milliLiter(s) Oral every 4 hours PRN Dyspepsia  benzocaine 15 mG/menthol 3.6 mG (Sugar-Free) Lozenge 1 Lozenge Oral two times a day PRN Sore Throat  benzonatate 100 milliGRAM(s) Oral every 8 hours PRN Cough  guaiFENesin   Syrup  (Sugar-Free) 100 milliGRAM(s) Oral every 6 hours PRN Cough  ondansetron    Tablet 4 milliGRAM(s) Oral every 6 hours PRN Nausea and/or Vomiting    Allergies    erythromycin (Anaphylaxis; Hives)    Intolerances        VITAL SIGNS:  Vital Signs Last 24 Hrs  T(C): 36.7 (06 Apr 2020 08:30), Max: 36.8 (05 Apr 2020 23:02)  T(F): 98 (06 Apr 2020 08:30), Max: 98.2 (05 Apr 2020 23:02)  HR: 59 (06 Apr 2020 08:30) (46 - 85)  BP: 152/74 (06 Apr 2020 08:30) (152/74 - 156/72)  BP(mean): --  RR: 16 (06 Apr 2020 08:30) (16 - 18)  SpO2: 93% (06 Apr 2020 08:30) (90% - 94%)        PHYSICAL EXAM:  General: NAD, Laying comfortably in bed  HEENT: NC/AT, anicteric sclera, MMM  Neck: supple  Cardiovascular: +S1/S2, RRR, No murmurs, rubs, gallops  Respiratory: CTA B/L, no W/R/R  Gastrointestinal: soft, NT/ND, +BSx4  Extremities: WWP, no edema, clubbing or cyanosis  Vascular: 2+ radial, DP/PT pulses B/L  Neurological: AAOx3, no focal deficits      LABS:                        13.1   5.83  )-----------( 170      ( 06 Apr 2020 09:06 )             39.2     04-06    140  |  103  |  20  ----------------------------<  139<H>  4.6   |  23  |  0.76    Ca    9.1      06 Apr 2020 09:06  Phos  2.8     04-06  Mg     2.2     04-06    TPro  6.8  /  Alb  3.8  /  TBili  0.4  /  DBili  x   /  AST  15  /  ALT  39  /  AlkPhos  74  04-06        CAPILLARY BLOOD GLUCOSE              RADIOLOGY & ADDITIONAL TESTS: Reviewed.

## 2020-04-07 ENCOUNTER — TRANSCRIPTION ENCOUNTER (OUTPATIENT)
Age: 70
End: 2020-04-07

## 2020-04-07 VITALS
RESPIRATION RATE: 19 BRPM | TEMPERATURE: 98 F | HEART RATE: 60 BPM | SYSTOLIC BLOOD PRESSURE: 148 MMHG | OXYGEN SATURATION: 93 % | DIASTOLIC BLOOD PRESSURE: 77 MMHG

## 2020-04-07 LAB
ALBUMIN SERPL ELPH-MCNC: 3.9 G/DL — SIGNIFICANT CHANGE UP (ref 3.3–5)
ALP SERPL-CCNC: 78 U/L — SIGNIFICANT CHANGE UP (ref 40–120)
ALT FLD-CCNC: 36 U/L — SIGNIFICANT CHANGE UP (ref 10–45)
ANION GAP SERPL CALC-SCNC: 12 MMOL/L — SIGNIFICANT CHANGE UP (ref 5–17)
AST SERPL-CCNC: 14 U/L — SIGNIFICANT CHANGE UP (ref 10–40)
BASOPHILS # BLD AUTO: 0.03 K/UL — SIGNIFICANT CHANGE UP (ref 0–0.2)
BASOPHILS NFR BLD AUTO: 0.5 % — SIGNIFICANT CHANGE UP (ref 0–2)
BILIRUB SERPL-MCNC: 0.5 MG/DL — SIGNIFICANT CHANGE UP (ref 0.2–1.2)
BUN SERPL-MCNC: 23 MG/DL — SIGNIFICANT CHANGE UP (ref 7–23)
CALCIUM SERPL-MCNC: 9.5 MG/DL — SIGNIFICANT CHANGE UP (ref 8.4–10.5)
CHLORIDE SERPL-SCNC: 103 MMOL/L — SIGNIFICANT CHANGE UP (ref 96–108)
CO2 SERPL-SCNC: 23 MMOL/L — SIGNIFICANT CHANGE UP (ref 22–31)
CREAT SERPL-MCNC: 0.83 MG/DL — SIGNIFICANT CHANGE UP (ref 0.5–1.3)
CRP SERPL-MCNC: 0.52 MG/DL — HIGH (ref 0–0.4)
D DIMER BLD IA.RAPID-MCNC: 650 NG/ML DDU — HIGH
EOSINOPHIL # BLD AUTO: 0 K/UL — SIGNIFICANT CHANGE UP (ref 0–0.5)
EOSINOPHIL NFR BLD AUTO: 0 % — SIGNIFICANT CHANGE UP (ref 0–6)
FERRITIN SERPL-MCNC: 1259 NG/ML — HIGH (ref 15–150)
GLUCOSE SERPL-MCNC: 128 MG/DL — HIGH (ref 70–99)
HCT VFR BLD CALC: 41.8 % — SIGNIFICANT CHANGE UP (ref 34.5–45)
HGB BLD-MCNC: 14 G/DL — SIGNIFICANT CHANGE UP (ref 11.5–15.5)
IMM GRANULOCYTES NFR BLD AUTO: 7 % — HIGH (ref 0–1.5)
LYMPHOCYTES # BLD AUTO: 0.84 K/UL — LOW (ref 1–3.3)
LYMPHOCYTES # BLD AUTO: 13.1 % — SIGNIFICANT CHANGE UP (ref 13–44)
MAGNESIUM SERPL-MCNC: 2.2 MG/DL — SIGNIFICANT CHANGE UP (ref 1.6–2.6)
MCHC RBC-ENTMCNC: 31.1 PG — SIGNIFICANT CHANGE UP (ref 27–34)
MCHC RBC-ENTMCNC: 33.5 GM/DL — SIGNIFICANT CHANGE UP (ref 32–36)
MCV RBC AUTO: 92.9 FL — SIGNIFICANT CHANGE UP (ref 80–100)
MONOCYTES # BLD AUTO: 0.45 K/UL — SIGNIFICANT CHANGE UP (ref 0–0.9)
MONOCYTES NFR BLD AUTO: 7 % — SIGNIFICANT CHANGE UP (ref 2–14)
NEUTROPHILS # BLD AUTO: 4.65 K/UL — SIGNIFICANT CHANGE UP (ref 1.8–7.4)
NEUTROPHILS NFR BLD AUTO: 72.4 % — SIGNIFICANT CHANGE UP (ref 43–77)
NRBC # BLD: 0 /100 WBCS — SIGNIFICANT CHANGE UP (ref 0–0)
PHOSPHATE SERPL-MCNC: 3.4 MG/DL — SIGNIFICANT CHANGE UP (ref 2.5–4.5)
PLATELET # BLD AUTO: 205 K/UL — SIGNIFICANT CHANGE UP (ref 150–400)
POTASSIUM SERPL-MCNC: 4.7 MMOL/L — SIGNIFICANT CHANGE UP (ref 3.5–5.3)
POTASSIUM SERPL-SCNC: 4.7 MMOL/L — SIGNIFICANT CHANGE UP (ref 3.5–5.3)
PROT SERPL-MCNC: 7.1 G/DL — SIGNIFICANT CHANGE UP (ref 6–8.3)
RBC # BLD: 4.5 M/UL — SIGNIFICANT CHANGE UP (ref 3.8–5.2)
RBC # FLD: 13.1 % — SIGNIFICANT CHANGE UP (ref 10.3–14.5)
SODIUM SERPL-SCNC: 138 MMOL/L — SIGNIFICANT CHANGE UP (ref 135–145)
WBC # BLD: 6.42 K/UL — SIGNIFICANT CHANGE UP (ref 3.8–10.5)
WBC # FLD AUTO: 6.42 K/UL — SIGNIFICANT CHANGE UP (ref 3.8–10.5)

## 2020-04-07 PROCEDURE — 85610 PROTHROMBIN TIME: CPT

## 2020-04-07 PROCEDURE — 97161 PT EVAL LOW COMPLEX 20 MIN: CPT

## 2020-04-07 PROCEDURE — 71045 X-RAY EXAM CHEST 1 VIEW: CPT

## 2020-04-07 PROCEDURE — 87635 SARS-COV-2 COVID-19 AMP PRB: CPT

## 2020-04-07 PROCEDURE — 84484 ASSAY OF TROPONIN QUANT: CPT

## 2020-04-07 PROCEDURE — 84145 PROCALCITONIN (PCT): CPT

## 2020-04-07 PROCEDURE — 86480 TB TEST CELL IMMUN MEASURE: CPT

## 2020-04-07 PROCEDURE — 87507 IADNA-DNA/RNA PROBE TQ 12-25: CPT

## 2020-04-07 PROCEDURE — 87324 CLOSTRIDIUM AG IA: CPT

## 2020-04-07 PROCEDURE — 36415 COLL VENOUS BLD VENIPUNCTURE: CPT

## 2020-04-07 PROCEDURE — 87040 BLOOD CULTURE FOR BACTERIA: CPT

## 2020-04-07 PROCEDURE — 85652 RBC SED RATE AUTOMATED: CPT

## 2020-04-07 PROCEDURE — 82550 ASSAY OF CK (CPK): CPT

## 2020-04-07 PROCEDURE — 93005 ELECTROCARDIOGRAM TRACING: CPT

## 2020-04-07 PROCEDURE — 87449 NOS EACH ORGANISM AG IA: CPT

## 2020-04-07 PROCEDURE — 82728 ASSAY OF FERRITIN: CPT

## 2020-04-07 PROCEDURE — 83880 ASSAY OF NATRIURETIC PEPTIDE: CPT

## 2020-04-07 PROCEDURE — 83615 LACTATE (LD) (LDH) ENZYME: CPT

## 2020-04-07 PROCEDURE — 85025 COMPLETE CBC W/AUTO DIFF WBC: CPT

## 2020-04-07 PROCEDURE — 99239 HOSP IP/OBS DSCHRG MGMT >30: CPT

## 2020-04-07 PROCEDURE — 86803 HEPATITIS C AB TEST: CPT

## 2020-04-07 PROCEDURE — 84100 ASSAY OF PHOSPHORUS: CPT

## 2020-04-07 PROCEDURE — 86140 C-REACTIVE PROTEIN: CPT

## 2020-04-07 PROCEDURE — 80053 COMPREHEN METABOLIC PANEL: CPT

## 2020-04-07 PROCEDURE — 83735 ASSAY OF MAGNESIUM: CPT

## 2020-04-07 PROCEDURE — 85730 THROMBOPLASTIN TIME PARTIAL: CPT

## 2020-04-07 PROCEDURE — 83605 ASSAY OF LACTIC ACID: CPT

## 2020-04-07 PROCEDURE — 87641 MR-STAPH DNA AMP PROBE: CPT

## 2020-04-07 PROCEDURE — 99285 EMERGENCY DEPT VISIT HI MDM: CPT

## 2020-04-07 PROCEDURE — 85379 FIBRIN DEGRADATION QUANT: CPT

## 2020-04-07 RX ADMIN — Medication 25 MICROGRAM(S): at 05:06

## 2020-04-07 NOTE — DISCHARGE NOTE PROVIDER - CARE PROVIDERS DIRECT ADDRESSES
,DirectAddress_Unknown ,DirectAddress_Unknown,[email or web address]

## 2020-04-07 NOTE — DISCHARGE NOTE PROVIDER - CARE PROVIDER_API CALL
Erasmo Ware  Phone: [phone]  Fax: (   )    -  Follow Up Time: 2 weeks Erasmo Ware  Phone: [phone]  Fax: (   )    -  Follow Up Time: 2 weeks    Reena Dominguez)  Critical Care Medicine; Pulmonary Disease  [address] [address]  Phone: [phone]  Fax: [phone]  Follow Up Time:

## 2020-04-07 NOTE — DISCHARGE NOTE PROVIDER - PROVIDER TOKENS
FREE:[LAST:[Chaz],FIRST:[Erasmo],PHONE:[phone]],FAX:[(   )    -],FOLLOWUP:[2 weeks]] FREE:[LAST:[Chaz],FIRST:[Erasmo],PHONE:[phone]],FAX:[(   )    -],FOLLOWUP:[2 weeks]],PROVIDER:[TOKEN:[[id number]]]

## 2020-04-07 NOTE — DISCHARGE NOTE PROVIDER - NSDCCPCAREPLAN_GEN_ALL_CORE_FT
PRINCIPAL DISCHARGE DIAGNOSIS  Diagnosis: Hypoxia  Assessment and Plan of Treatment: You were positive for the novel new coronavirus, also known as COVID-19. This test resulted from a nasal and oral (mouth) swab that was done earlier in your admission to [facility]. Your symptoms improved dramatically during your hospital stay. Sinceyour test result were positive, the treatment is supportive care, which means: rest, hydration, and maintaining a good healthy diet. You may take Tylenol if you experience any fevers and Robitussin for cough.   If you start experiencing extreme shortness of breath, difficulty breathing resulting in the inability to even walk, please visit an urgent care. Please maintain a 2 week (14 day) quarantine till 4/12/2020 in your apartment. The department of health will follow-up with you via phone, please do not go to your PCP while in self quarantine. Wear a mask at all times should you have to be outdoors briefly - and avoid crowds, public spaces, and mass transit at all times during this quarantine. Continue to wash your hands thoroughly and frequently. Please see the supplemented written instructions for further use.        SECONDARY DISCHARGE DIAGNOSES  Diagnosis: Fever  Assessment and Plan of Treatment:     Diagnosis: Shortness of breath  Assessment and Plan of Treatment: PRINCIPAL DISCHARGE DIAGNOSIS  Diagnosis: Hypoxia  Assessment and Plan of Treatment: You required oxygen because of your underlying coronavirus. You were treated with supplemental oxygen and upon discharge you were ambulating well on room air. If you have worsening shortness of breath of fever, please return to the ED.      SECONDARY DISCHARGE DIAGNOSES  Diagnosis: COVID-19  Assessment and Plan of Treatment: You were positive for the novel new coronavirus, also known as COVID-19. This test resulted from a nasal and oral (mouth) swab that was done earlier in your admission to [facility]. Your symptoms improved dramatically during your hospital stay. Sinceyour test result were positive, the treatment is supportive care, which means: rest, hydration, and maintaining a good healthy diet. You may take Tylenol if you experience any fevers and Robitussin for cough.   If you start experiencing extreme shortness of breath, difficulty breathing resulting in the inability to even walk, please visit an urgent care. Please maintain a 2 week (14 day) quarantine till 4/12/2020 in your apartment. Do not go to your PCP while in self quarantine. Wear a mask at all times should you have to be outdoors briefly - and avoid crowds, public spaces, and mass transit at all times during this quarantine. Continue to wash your hands thoroughly and frequently. Please see the supplemented written instructions for further use. PRINCIPAL DISCHARGE DIAGNOSIS  Diagnosis: Hypoxia  Assessment and Plan of Treatment: You required oxygen because of your underlying coronavirus. You were treated with supplemental oxygen and upon discharge you were ambulating well on room air. If you have worsening shortness of breath of fever, please return to the ED.      SECONDARY DISCHARGE DIAGNOSES  Diagnosis: COVID-19  Assessment and Plan of Treatment: You were positive for the novel new coronavirus, also known as COVID-19. This test resulted from a nasal and oral (mouth) swab that was done earlier in your admission to [facility]. Your symptoms improved dramatically during your hospital stay. Sinceyour test result were positive, the treatment is supportive care, which means: rest, hydration, and maintaining a good healthy diet. You may take Tylenol if you experience any fevers and Robitussin for cough.   If you start experiencing extreme shortness of breath, difficulty breathing resulting in the inability to even walk, please visit an urgent care. Please maintain a 2 week (14 day) quarantine till 4/12/2020 in your apartment. Do not go to your PCP while in self quarantine. Wear a mask at all times should you have to be outdoors briefly - and avoid crowds, public spaces, and mass transit at all times during this quarantine. Continue to wash your hands thoroughly and frequently. Please see the supplemented written instructions for further use.    Diagnosis: Hospital-acquired pneumonia  Assessment and Plan of Treatment: HAP (hospital-acquired pneumonia)  You were treated for HAP with IV and oral antibiotics. You should continue with Levaquin 750 mg daily for another 2 days until it's finished. Please take it with food. You may take priobiotic or yogurt to avoid upset stomach and/or diarrhea.

## 2020-04-07 NOTE — DISCHARGE NOTE PROVIDER - NSDCFUADDINST_GEN_ALL_CORE_FT
Please make a follow up appointment with your Primary care provider after your quarantine is over. Make sure you bring these discharge papers to that visit.

## 2020-04-07 NOTE — DISCHARGE NOTE NURSING/CASE MANAGEMENT/SOCIAL WORK - PATIENT PORTAL LINK FT
You can access the FollowMyHealth Patient Portal offered by [facility] by registering at the following website: [email or web address]. By joining [facility]’s FollowMyHealth portal, you will also be able to view your health information using other applications (apps) compatible with our system.

## 2020-04-07 NOTE — DISCHARGE NOTE PROVIDER - HOSPITAL COURSE
Pt is a 68 y/o F with PMHx hypothyroidism, splenic artery aneurysm (s/p surgery), appendectomy, now admitted with COVID+.        Problem List/Main Diagnoses (system-based):         Problem: Respiratory failure with hypoxia (difficulty breathing).     Plan: Patient presented with SOB, and with hypoxia 2/2 COVID + infection     - saturating 94% on Room air    - Mantaining 92% ambulatory on Room Air              Problem/Plan - 2:    ·  Problem: SARS-associated coronavirus infection.      Plan: Patient presented with SOB and fevers, + COVID, afebrile    - Isolation precautions; contact and isolation    - Tylenol 650mg for fever     - status post Solumedrol started 4/1 and s/p Tocilizamab (4/1)     - CRP and Ferritin downtrending.          Problem/Plan - 3:    ·  Problem: HAP (hospital-acquired pneumonia).      Plan: Concomitantly treating for HAP. MRSA swab negative    -Pt with abd. cramps/diarrhea, possible due to PO Levaquin 750mg Daily    - diarrhea resolved    - c/w Levaquin for 4 more days         Problem/Plan - 4:    ·  Problem: Thrombocytopenia.      Plan: Patient presented with Plt at 45, now [id number] this AM, no active signs of bleeding, likely 2/2 viral infection (no anemia, or increased bilirubin, or signs of hemolysis)              Problem/Plan - 5:    ·  Problem: Hypothyroidism.  Plan: Continue home medication synthroid    - C/w with Levothyroxine 25 mcg daily.                    Inpatient treatment course:         Patient was admitted with hypoxic respiratory failure associated with COVID viral infection and superimposed with pneumonia.    Patient was treated with Plaquenil, Azithromycin, Tocilizumab, Steroids, Vanco/ Zosyn/ Levaquin.        New medications:     C/w Levaquin 750 mg PO daily for 4 more days        Labs to be followed outpatient:     n/a        Exam to be followed outpatient:     Routine follow up appointment with PCP Pt is a 68 y/o F with PMHx hypothyroidism, splenic artery aneurysm (s/p surgery), appendectomy, now admitted with COVID+.        Problem List/Main Diagnoses (system-based):         Problem: Respiratory failure with hypoxia (difficulty breathing).     Plan: Patient presented with SOB, and with hypoxia 2/2 COVID + infection     - saturating 94% on Room air    - Mantaining 92% ambulatory on Room Air              Problem/Plan - 2:    ·  Problem: SARS-associated coronavirus infection.      Plan: Patient presented with SOB and fevers, + COVID, afebrile    - Isolation precautions; contact and isolation    - Tylenol 650mg for fever     - status post Solumedrol started 4/1 and s/p Tocilizamab (4/1)     - CRP and Ferritin downtrending.          Problem/Plan - 3:    ·  Problem: HAP (hospital-acquired pneumonia).      Plan: Concomitantly treating for HAP. MRSA swab negative    -Pt with abd. cramps/diarrhea, possible due to PO Levaquin 750mg Daily    - diarrhea resolved             Problem/Plan - 4:    ·  Problem: Thrombocytopenia.      Plan: Patient presented with Plt at 45, now [id number] this AM, no active signs of bleeding, likely 2/2 viral infection (no anemia, or increased bilirubin, or signs of hemolysis)              Problem/Plan - 5:    ·  Problem: Hypothyroidism.  Plan: Continue home medication synthroid    - C/w with Levothyroxine 25 mcg daily.                    Inpatient treatment course:         Patient was admitted with hypoxic respiratory failure associated with COVID viral infection and superimposed with pneumonia.    Patient was treated with Plaquenil, Azithromycin, Tocilizumab, Steroids, Vanco/ Zosyn/ Levaquin.        New medications:     C/w Levaquin 750 mg PO daily for 4 more days        Labs to be followed outpatient:     n/a        Exam to be followed outpatient:     Routine follow up appointment with PCP

## 2020-04-07 NOTE — DISCHARGE NOTE PROVIDER - NSDCMRMEDTOKEN_GEN_ALL_CORE_FT
benzonatate 100 mg oral capsule: 1 cap(s) orally every 8 hours, As needed, Cough  guaiFENesin 100 mg/5 mL oral liquid: 5 milliliter(s) orally every 6 hours, As needed, Cough  levoFLOXacin: tab(s) orally once a day  Synthroid 25 mcg (0.025 mg) oral tablet: 1 tab(s) orally once a day  Tylenol: benzonatate 100 mg oral capsule: 1 cap(s) orally every 8 hours, As needed, Cough  guaiFENesin 100 mg/5 mL oral liquid: 5 milliliter(s) orally every 6 hours, As needed, Cough  Synthroid 25 mcg (0.025 mg) oral tablet: 1 tab(s) orally once a day  Tylenol: benzonatate 100 mg oral capsule: 1 cap(s) orally every 8 hours, As needed, Cough  guaiFENesin 100 mg/5 mL oral liquid: 5 milliliter(s) orally every 6 hours, As needed, Cough  Levaquin 750 mg oral tablet: 1 tab(s) orally once a day   Synthroid 25 mcg (0.025 mg) oral tablet: 1 tab(s) orally once a day  Tylenol:

## 2020-04-10 DIAGNOSIS — E87.1 HYPO-OSMOLALITY AND HYPONATREMIA: ICD-10-CM

## 2020-04-10 DIAGNOSIS — T36.95XA ADVERSE EFFECT OF UNSPECIFIED SYSTEMIC ANTIBIOTIC, INITIAL ENCOUNTER: ICD-10-CM

## 2020-04-10 DIAGNOSIS — U07.1 COVID-19: ICD-10-CM

## 2020-04-10 DIAGNOSIS — E86.1 HYPOVOLEMIA: ICD-10-CM

## 2020-04-10 DIAGNOSIS — J96.01 ACUTE RESPIRATORY FAILURE WITH HYPOXIA: ICD-10-CM

## 2020-04-10 DIAGNOSIS — J12.89 OTHER VIRAL PNEUMONIA: ICD-10-CM

## 2020-04-10 DIAGNOSIS — K52.1 TOXIC GASTROENTERITIS AND COLITIS: ICD-10-CM

## 2020-04-10 DIAGNOSIS — J18.9 PNEUMONIA, UNSPECIFIED ORGANISM: ICD-10-CM

## 2020-04-10 DIAGNOSIS — D69.59 OTHER SECONDARY THROMBOCYTOPENIA: ICD-10-CM
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
## 2020-04-10 DIAGNOSIS — E66.9 OBESITY, UNSPECIFIED: ICD-10-CM

## 2020-04-10 DIAGNOSIS — Z79.890 HORMONE REPLACEMENT THERAPY: ICD-10-CM

## 2020-04-10 DIAGNOSIS — Z88.1 ALLERGY STATUS TO OTHER ANTIBIOTIC AGENTS STATUS: ICD-10-CM

## 2020-04-10 DIAGNOSIS — E03.9 HYPOTHYROIDISM, UNSPECIFIED: ICD-10-CM

## 2020-04-10 DIAGNOSIS — Z90.49 ACQUIRED ABSENCE OF OTHER SPECIFIED PARTS OF DIGESTIVE TRACT: ICD-10-CM

## 2020-04-10 DIAGNOSIS — A08.39 OTHER VIRAL ENTERITIS: ICD-10-CM
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
## 2020-04-27 PROBLEM — E03.9 HYPOTHYROIDISM, UNSPECIFIED: Chronic | Status: ACTIVE | Noted: 2020-03-30

## 2020-04-28 PROBLEM — Z00.00 ENCOUNTER FOR PREVENTIVE HEALTH EXAMINATION: Status: ACTIVE | Noted: 2020-04-28

## 2020-04-29 ENCOUNTER — APPOINTMENT (OUTPATIENT)
Dept: PULMONOLOGY | Facility: CLINIC | Age: 70
End: 2020-04-29
Payer: MEDICARE

## 2020-04-29 VITALS
OXYGEN SATURATION: 94 % | RESPIRATION RATE: 12 BRPM | TEMPERATURE: 98 F | BODY MASS INDEX: 32.62 KG/M2 | DIASTOLIC BLOOD PRESSURE: 80 MMHG | WEIGHT: 203 LBS | SYSTOLIC BLOOD PRESSURE: 130 MMHG | HEART RATE: 75 BPM | HEIGHT: 66 IN

## 2020-04-29 DIAGNOSIS — Z85.820 PERSONAL HISTORY OF MALIGNANT MELANOMA OF SKIN: ICD-10-CM

## 2020-04-29 DIAGNOSIS — Z80.0 FAMILY HISTORY OF MALIGNANT NEOPLASM OF DIGESTIVE ORGANS: ICD-10-CM

## 2020-04-29 DIAGNOSIS — Z78.9 OTHER SPECIFIED HEALTH STATUS: ICD-10-CM

## 2020-04-29 DIAGNOSIS — Z80.3 FAMILY HISTORY OF MALIGNANT NEOPLASM OF BREAST: ICD-10-CM

## 2020-04-29 DIAGNOSIS — I72.8 ANEURYSM OF OTHER SPECIFIED ARTERIES: ICD-10-CM

## 2020-04-29 PROCEDURE — 71046 X-RAY EXAM CHEST 2 VIEWS: CPT | Mod: CS

## 2020-04-29 PROCEDURE — 36415 COLL VENOUS BLD VENIPUNCTURE: CPT | Mod: CS

## 2020-04-29 PROCEDURE — 99214 OFFICE O/P EST MOD 30 MIN: CPT | Mod: CS,25

## 2020-04-29 NOTE — PHYSICAL EXAM
[No Acute Distress] : no acute distress [Normal Oropharynx] : normal oropharynx [Normal Appearance] : normal appearance [No Neck Mass] : no neck mass [Normal S1, S2] : normal s1, s2 [Normal Rate/Rhythm] : normal rate/rhythm [No Murmurs] : no murmurs [No Resp Distress] : no resp distress [Clear to Auscultation Bilaterally] : clear to auscultation bilaterally [No Abnormalities] : no abnormalities [Benign] : benign [Normal Gait] : normal gait [No Clubbing] : no clubbing [No Cyanosis] : no cyanosis [FROM] : FROM [No Edema] : no edema [Normal Color/ Pigmentation] : normal color/ pigmentation [No Focal Deficits] : no focal deficits [Oriented x3] : oriented x3 [Normal Affect] : normal affect

## 2020-04-30 NOTE — ASSESSMENT
[FreeTextEntry1] : COVID 19 / PNA\par \par 69 year old female with PMHx of hypothyroidism, splenic artery aneurysm (s/p sx), appendectomy with recent admission to [facility] from 3/30/20-4/7/20 with COVID-19 virus. Was able to maintain oxygen of 92-94% on RA. Patient concomitantly treated for HAP w/ course of Levaquin 750 mg. Given Plaquenil, Azithromycin, Tocilizumab, Steroids & Vancomycin as well during hospitalization. Discharged in stable condition with no supplemental oxygen requirements. Prior to discharge, D-Dimer 650, Ferritin 1259 & CRP 0.52. CXR revealed b/l infiltrates; greater on right side than left. Repeat CXR today showed resolution of the bilateral infiltrates.  Clinically pt has improved since discharge and her oxygen saturation is maintained over 90%. Follow up with lab work today.

## 2020-04-30 NOTE — REASON FOR VISIT
[Follow-Up - From Hospitalization] : a follow-up visit after a recent hospitalization [TextBox_44] : COVID-19

## 2020-05-18 PROBLEM — U07.1 COVID-19 VIRUS DETECTED: Status: ACTIVE | Noted: 2020-04-29

## 2020-05-19 ENCOUNTER — APPOINTMENT (OUTPATIENT)
Dept: PULMONOLOGY | Facility: CLINIC | Age: 70
End: 2020-05-19
Payer: MEDICARE

## 2020-05-19 VITALS
OXYGEN SATURATION: 95 % | HEART RATE: 74 BPM | RESPIRATION RATE: 12 BRPM | SYSTOLIC BLOOD PRESSURE: 130 MMHG | TEMPERATURE: 98.1 F | WEIGHT: 203 LBS | DIASTOLIC BLOOD PRESSURE: 70 MMHG | HEIGHT: 66 IN | BODY MASS INDEX: 32.62 KG/M2

## 2020-05-19 DIAGNOSIS — U07.1 COVID-19: ICD-10-CM

## 2020-05-19 DIAGNOSIS — J18.9 PNEUMONIA, UNSPECIFIED ORGANISM: ICD-10-CM

## 2020-05-19 DIAGNOSIS — M54.9 DORSALGIA, UNSPECIFIED: ICD-10-CM

## 2020-05-19 PROCEDURE — 99214 OFFICE O/P EST MOD 30 MIN: CPT | Mod: CS

## 2020-05-19 PROCEDURE — 71046 X-RAY EXAM CHEST 2 VIEWS: CPT | Mod: CS

## 2020-05-19 RX ORDER — LEVOTHYROXINE SODIUM 0.17 MG/1
TABLET ORAL
Refills: 0 | Status: ACTIVE | COMMUNITY

## 2020-05-20 LAB
SARS-COV-2 IGG SERPL IA-ACNC: 47 INDEX
SARS-COV-2 IGG SERPL QL IA: POSITIVE

## 2020-05-20 NOTE — PHYSICAL EXAM
[No Acute Distress] : no acute distress [Normal Rate/Rhythm] : normal rate/rhythm [No Murmurs] : no murmurs [Normal S1, S2] : normal s1, s2 [No Resp Distress] : no resp distress [Clear to Auscultation Bilaterally] : clear to auscultation bilaterally [Benign] : benign [No Abnormalities] : no abnormalities [No Clubbing] : no clubbing [No Cyanosis] : no cyanosis [Normal Gait] : normal gait [FROM] : FROM [Normal Color/ Pigmentation] : normal color/ pigmentation [No Focal Deficits] : no focal deficits [Oriented x3] : oriented x3 [Normal Affect] : normal affect [Well Nourished] : well nourished [Normal Appearance] : normal appearance [Well Groomed] : well groomed [No Acc Muscle Use] : no acc muscle use [Well Developed] : well developed [Normal Rhythm and Effort] : normal rhythm and effort [TextBox_11] : normocephalic [TextBox_80] : no visualized abnormality overlying area where pt notes localized back pain [TextBox_105] : mild edema of lower extremities R>L, wearing compression stockings

## 2020-05-20 NOTE — REVIEW OF SYSTEMS
[SOB on Exertion] : sob on exertion [Negative] : Psychiatric [Cough] : no cough [Dyspnea] : no dyspnea [TextBox_94] : localized mild pain left back inferior to scapula, increases with exertion

## 2020-05-20 NOTE — ASSESSMENT
[FreeTextEntry1] : 1. COVID 19 / PNA\par 69 year old female with PMHx of hypothyroidism, splenic artery aneurysm (s/p sx), appendectomy with recent admission to [facility] from 3/30/20-4/7/20 with COVID-19 virus. Was able to maintain oxygen of 92-94% on RA. Patient concomitantly treated for HAP w/ course of Levaquin 750 mg. Given Plaquenil, Azithromycin, Tocilizumab, Steroids & Vancomycin as well during hospitalization. Discharged in stable condition with no supplemental oxygen requirements. Prior to discharge, D-Dimer 650, Ferritin 1259 & CRP 0.52. CXR revealed b/l infiltrates; greater on right side than left. \par \par Symptoms continue to improve and she is starting to exercise in the pool again.  Continues with JACKSON and fatigue but these are continuing to gradually improve.\par Lungs clear B/L to auscultation. \par Repeat CXR on 4/29/20 showed resolution of the bilateral infiltrates but perhaps prominent interstitial markings. Today, CXR is clear.\par Her labs including ferritin and d-dimer have show significant improvement since her hospitalization.\par \par As she is interested in possibly donating plasma, we will check COVID-19 IgG level today. \par \par 2. Persistent left-sided back pain\par Since her admission, she has had persistent left-sided back pain inferior to the scapula that seems to increase with exertion. We will get a CT chest noncontrast to evaluate.  Pt was given a prescription to take with her. \par \par RTC: only as needed

## 2020-05-20 NOTE — HISTORY OF PRESENT ILLNESS
[Never] : never [TextBox_4] : 69 year old female with PMHx of hypothyroidism, splenic artery aneurysm (s/p sx), appendectomy with recent admission to [facility] from 3/30/20-4/7/20 with COVID-19 virus. Was able to maintain oxygen of 92-94% on RA. Patient concomitantly treated for HAP w/ course of Levaquin 750 mg. Given Plaquenil, Azithromycin, Tocilizumab, Steroids & Vancomycin as well during hospitalization. Discharged in stable condition with no supplemental oxygen requirements. Prior to discharge, D-Dimer 650, Ferritin 1259 & CRP 0.52. CXR revealed b/l infiltrates; greater on right side than left.\par \par 5/19/20:\par D-dimer improved from 650 to 284. Ferritin improved from 1259 to 314. CRP was 0.52, now 0.72.\par She feels her breathing is improving sine her last visit. She still has some JACKSON particularly if she walks and talks. \par She started swimming again last week. \par She continues with a localized pain on her left back, there at rest. \par Denies cough. Had mild sore throat for 4 days last week that resolved.  Appetite not yet back to normal but improving.

## 2020-10-01 LAB
ALBUMIN SERPL ELPH-MCNC: 4.5 G/DL
ALP BLD-CCNC: 64 U/L
ALT SERPL-CCNC: 23 U/L
ANION GAP SERPL CALC-SCNC: 14 MMOL/L
AST SERPL-CCNC: 21 U/L
BASOPHILS # BLD AUTO: 0.02 K/UL
BASOPHILS NFR BLD AUTO: 0.4 %
BILIRUB SERPL-MCNC: 0.4 MG/DL
BUN SERPL-MCNC: 18 MG/DL
CALCIUM SERPL-MCNC: 9.5 MG/DL
CHLORIDE SERPL-SCNC: 102 MMOL/L
CO2 SERPL-SCNC: 24 MMOL/L
CREAT SERPL-MCNC: 1.05 MG/DL
EOSINOPHIL # BLD AUTO: 0.55 K/UL
EOSINOPHIL NFR BLD AUTO: 10.5 %
GLUCOSE SERPL-MCNC: 118 MG/DL
HCT VFR BLD CALC: 34.1 %
HGB BLD-MCNC: 10.9 G/DL
IMM GRANULOCYTES NFR BLD AUTO: 0.8 %
LYMPHOCYTES # BLD AUTO: 0.73 K/UL
LYMPHOCYTES NFR BLD AUTO: 14 %
MAN DIFF?: NORMAL
MCHC RBC-ENTMCNC: 30 PG
MCHC RBC-ENTMCNC: 32 GM/DL
MCV RBC AUTO: 93.9 FL
MONOCYTES # BLD AUTO: 0.56 K/UL
MONOCYTES NFR BLD AUTO: 10.7 %
NEUTROPHILS # BLD AUTO: 3.33 K/UL
NEUTROPHILS NFR BLD AUTO: 63.6 %
PLATELET # BLD AUTO: 239 K/UL
POTASSIUM SERPL-SCNC: 4.1 MMOL/L
PROT SERPL-MCNC: 6.6 G/DL
RBC # BLD: 3.63 M/UL
RBC # FLD: 14.6 %
SODIUM SERPL-SCNC: 140 MMOL/L
WBC # FLD AUTO: 5.23 K/UL

## 2021-08-03 ENCOUNTER — APPOINTMENT (OUTPATIENT)
Dept: PULMONOLOGY | Facility: CLINIC | Age: 71
End: 2021-08-03
Payer: MEDICARE

## 2021-08-03 VITALS
SYSTOLIC BLOOD PRESSURE: 136 MMHG | DIASTOLIC BLOOD PRESSURE: 70 MMHG | HEART RATE: 56 BPM | OXYGEN SATURATION: 97 % | TEMPERATURE: 97.1 F | HEIGHT: 66 IN | WEIGHT: 212 LBS | BODY MASS INDEX: 34.07 KG/M2

## 2021-08-03 DIAGNOSIS — R05 COUGH: ICD-10-CM

## 2021-08-03 DIAGNOSIS — Z86.16 PERSONAL HISTORY OF COVID-19: ICD-10-CM

## 2021-08-03 PROCEDURE — 99213 OFFICE O/P EST LOW 20 MIN: CPT | Mod: 25

## 2021-08-03 PROCEDURE — 71046 X-RAY EXAM CHEST 2 VIEWS: CPT

## 2021-08-03 NOTE — PHYSICAL EXAM
[No Acute Distress] : no acute distress [Normal Oropharynx] : normal oropharynx [Normal Appearance] : normal appearance [Normal Rate/Rhythm] : normal rate/rhythm [Normal S1, S2] : normal s1, s2 [No Resp Distress] : no resp distress [Clear to Auscultation Bilaterally] : clear to auscultation bilaterally [No Abnormalities] : no abnormalities [Oriented x3] : oriented x3 [Normal Affect] : normal affect

## 2021-08-05 NOTE — PROCEDURE
[FreeTextEntry1] : cxr pa/lateral shows some reticular changes likely from her COVID disease but compared to prior study when she was hospitalized is markedly improved\par \par cannot do spirometry since she is not vaccinated

## 2021-08-05 NOTE — HISTORY OF PRESENT ILLNESS
[TextBox_4] : here to discuss a nagging chronic periodic cough shes had since being significantly ill from COVID over a year ago \par \par doing very well overall. still feels a bit depressed losing her  to COVID and they were sick and hospitalized around the same time at [facility]\par \par her cough is almost always dry, intermittent, and without any conceivable trigger and only started after she recovered from main COVID infection. \par \par she also wants to know if it is appropriate for her to get the COVID-19 vaccine and feels resentment toward the degree of misinformation about the vaccine perpetuated by the media and seeks the opinion of someone she trusts

## 2021-08-05 NOTE — DISCUSSION/SUMMARY
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 the upper airway/posterior pharynx -- likely due to her known history of COVID\par \par she is probably in the third phase of COVID-related cough where lingering cough itself is pathologic and serving no physiologic purpose of protecting her airway nor does it represent a beneficial cough in the initial weeks/months following COVID representing the resolution of residual inflammation\par \par will start trial of gabapentin 100 q8 x3ds with an upttiration schedule to 300 q8 TID if she responds to therapy for presumed neurogenic cough, she will start this regimen next week and call with update how shes doing\par \par spent a great deal of time explaining how incredibly important it is that she get vaccinated ASAP especially with the growing surge of delta variant COVID in the area -- she said she will do so. once she gets vaccinated and if the cough persists we can then obtain a fuill PFT to rule out other possibilities for cough

## 2021-10-19 ENCOUNTER — APPOINTMENT (OUTPATIENT)
Dept: ORTHOPEDIC SURGERY | Facility: CLINIC | Age: 71
End: 2021-10-19
Payer: MEDICARE

## 2021-10-19 VITALS — BODY MASS INDEX: 33.75 KG/M2 | HEIGHT: 66 IN | WEIGHT: 210 LBS

## 2021-10-19 DIAGNOSIS — M75.41 IMPINGEMENT SYNDROME OF RIGHT SHOULDER: ICD-10-CM

## 2021-10-19 PROCEDURE — 99204 OFFICE O/P NEW MOD 45 MIN: CPT

## 2021-10-19 RX ORDER — GABAPENTIN 100 MG/1
100 CAPSULE ORAL
Qty: 54 | Refills: 2 | Status: DISCONTINUED | COMMUNITY
Start: 2021-08-03 | End: 2021-10-19

## 2021-10-19 RX ORDER — MAGNESIUM OXIDE/MAG AA CHELATE 300 MG
CAPSULE ORAL
Refills: 0 | Status: ACTIVE | COMMUNITY

## 2021-10-19 RX ORDER — CHROMIUM 200 MCG
TABLET ORAL
Refills: 0 | Status: ACTIVE | COMMUNITY

## 2022-12-14 NOTE — PROGRESS NOTE ADULT - PROBLEM SELECTOR PROBLEM 2
Adderall refilled at 15 mg BID PDMP reviewed 
SARS-associated coronavirus infection

## 2023-01-25 LAB
CRP SERPL-MCNC: 0.72 MG/DL
DEPRECATED D DIMER PPP IA-ACNC: 284 NG/ML DDU
ERYTHROCYTE [SEDIMENTATION RATE] IN BLOOD BY WESTERGREN METHOD: 46 MM/HR
FERRITIN SERPL-MCNC: 314 NG/ML
TROPONIN I SERPL-MCNC: <0.01 NG/ML

## 2023-03-09 NOTE — PATIENT PROFILE ADULT - NSPROCHRONICPAIN_GEN_A_NUR
no Ftsg Text: Given the location of the defect, shape of the defect and the proximity to free margins a full thickness skin graft was deemed most appropriate. The risks, benefits, and possible outcomes of this procedure were discussed along with the risks, benefits, and possible outcomes of other options for wound repair (including but not limited to: variations of complex closure, flaps, other types of grafts, and second intention). The patient verbalized understanding and consent to the outlined procedure. The patient understands should the surgical site heal in a manner they find unsatisfactory further interventions or referral to an additional specialist may be required. The graft donor and recipient site were prepped and draped in sterile fashion. Anesthesia was checked and supplemented as necessary. The primary defect shape was templated with sterile suture packing and transferred to the donor site. The area thus outlined was incised, along with planned standing tissue cones, deep to adipose tissue with a #15 scalpel blade.  The harvested graft was then trimmed of adipose tissue until only dermis and epidermis was left.  The skin margins of the secondary defect were undermined to an appropriate distance in all directions utilizing fine tipped surgical scissors. Hemostasis was achieved and then the secondary defect was sutured with interrupted buried subcutaneous sutures. The skin graft was then placed in the primary defect and oriented appropriately. The graft was sutured into place with simple interrupted and simple running cutaneous sutures.

## 2024-10-31 NOTE — HISTORY OF PRESENT ILLNESS
Lab Results   Component Value Date    EGFR 36 10/31/2024    EGFR 38 10/31/2024    EGFR 58 10/30/2024    CREATININE 1.34 (H) 10/31/2024    CREATININE 1.29 10/31/2024    CREATININE 0.91 10/30/2024   Baseline creatinine 0.9.  Trended up today to 1.34 due to diuresis; IV Bumex now being converted to oral  Continue to trend.  May have to accept higher creatinine to remain euvolemic  Avoid hypotension/nephrotoxic agents   [Never] : never [TextBox_4] : 69 year old female with PMHx of hypothyroidism, splenic artery aneurysm (s/p sx), appendectomy with recent admission to [facility] from 3/30/20-4/7/20 with COVID-19 virus. Was able to maintain oxygen of 92-94% on RA. Patient concomitantly treated for HAP w/ course of Levaquin 750 mg. Given Plaquenil, Azithromycin, Tocilizumab, Steroids & Vancomycin as well during hospitalization. Discharged in stable condition with no supplemental oxygen requirements. \par \par Prior to discharge, D-Dimer 650, Ferritin 1259 & CRP 0.52. \par \par CXR revealed b/l infiltrates; greater on right side than left.\par \par When she talks she feels she need to breath more.  This morning she was trying to vacuum and felt fatigue after.  She has pain on the left lower back.  She was very active with aqua swim 5 days a week and walking every morning.  Yesterday walked 1 mile which took her 45 mins which is much slower for her.  She denies coughing, wheezing and no fever.  No 02 at home.  Denies chest pain. She is not sleeping well. \par \par She was given 2nd dosage of Levaquin from her PCP.

## 2025-06-27 NOTE — PROGRESS NOTE ADULT - PROBLEM SELECTOR PLAN 3
1st attempt. Left message to call back and schedule physical   Patient presented with Plt at 45 > 56 yesterday AM, no active signs of bleeding, likely 2/2 viral infection (no anemia, or increased bilirubin, or signs of hemolysis)   - Monitor CBC   - Hold DVT ppx